# Patient Record
Sex: FEMALE | Race: WHITE | NOT HISPANIC OR LATINO | URBAN - METROPOLITAN AREA
[De-identification: names, ages, dates, MRNs, and addresses within clinical notes are randomized per-mention and may not be internally consistent; named-entity substitution may affect disease eponyms.]

---

## 2022-04-30 ENCOUNTER — TELEPHONE ENCOUNTER (OUTPATIENT)
Dept: URBAN - METROPOLITAN AREA CLINIC 121 | Facility: CLINIC | Age: 72
End: 2022-04-30

## 2022-04-30 RX ORDER — GLIPIZIDE AND METFORMIN HYDROCHLORIDE 2.5; 25 MG/1; MG/1
TABLET, FILM COATED ORAL
OUTPATIENT
Start: 2013-07-02

## 2022-05-01 ENCOUNTER — TELEPHONE ENCOUNTER (OUTPATIENT)
Dept: URBAN - METROPOLITAN AREA CLINIC 121 | Facility: CLINIC | Age: 72
End: 2022-05-01

## 2022-05-01 RX ORDER — GLIPIZIDE AND METFORMIN HYDROCHLORIDE 2.5; 25 MG/1; MG/1
2TABS PO BID TABLET, FILM COATED ORAL
Status: ACTIVE | COMMUNITY
Start: 2013-07-08

## 2022-05-01 RX ORDER — CARISOPRODOL 350 MG/1
BID PRN TABLET ORAL
Status: ACTIVE | COMMUNITY
Start: 2013-07-08

## 2022-05-01 RX ORDER — HYDROCODONE BITARTRATE AND ACETAMINOPHEN 7.5; 3 MG/1; MG/1
BID QD TABLET ORAL
Status: ACTIVE | COMMUNITY
Start: 2013-07-08

## 2022-05-01 RX ORDER — HYOSCYAMINE SULFATE 0.38 MG/1
TAKE 1 TABLET BY MOUTH BEFORE DINNER TABLET, EXTENDED RELEASE ORAL
Status: ACTIVE | COMMUNITY
Start: 2013-07-08

## 2022-06-07 ENCOUNTER — ANESTHESIA EVENT (OUTPATIENT)
Dept: SURGERY | Age: 72
End: 2022-06-07
Payer: MEDICARE

## 2022-06-07 ENCOUNTER — PREP FOR PROCEDURE (OUTPATIENT)
Dept: ADMINISTRATIVE | Age: 72
End: 2022-06-07

## 2022-06-07 RX ORDER — PHENYLEPHRINE HCL 2.5 %
1 DROPS OPHTHALMIC (EYE)
Status: CANCELLED | OUTPATIENT
Start: 2022-06-07 | End: 2022-06-07

## 2022-06-07 RX ORDER — CYCLOPENTOLATE HYDROCHLORIDE 20 MG/ML
1 SOLUTION/ DROPS OPHTHALMIC
Status: CANCELLED | OUTPATIENT
Start: 2022-06-07 | End: 2022-06-07

## 2022-06-07 RX ORDER — TROPICAMIDE 10 MG/ML
1 SOLUTION/ DROPS OPHTHALMIC
Status: CANCELLED | OUTPATIENT
Start: 2022-06-07 | End: 2022-06-07

## 2022-06-08 ENCOUNTER — ANESTHESIA (OUTPATIENT)
Dept: SURGERY | Age: 72
End: 2022-06-08
Payer: MEDICARE

## 2022-06-08 ENCOUNTER — HOSPITAL ENCOUNTER (OUTPATIENT)
Age: 72
Setting detail: OUTPATIENT SURGERY
Discharge: HOME | End: 2022-06-08
Attending: OPHTHALMOLOGY | Admitting: OPHTHALMOLOGY
Payer: MEDICARE

## 2022-06-08 VITALS
DIASTOLIC BLOOD PRESSURE: 84 MMHG | TEMPERATURE: 98.2 F | SYSTOLIC BLOOD PRESSURE: 178 MMHG | RESPIRATION RATE: 16 BRPM | OXYGEN SATURATION: 100 % | HEART RATE: 94 BPM | WEIGHT: 180 LBS

## 2022-06-08 LAB
GLUCOSE BLD STRIP.AUTO-MCNC: 112 MG/DL (ref 65–100)
SERVICE CMNT-IMP: ABNORMAL

## 2022-06-08 PROCEDURE — 7100000001 HC PACU RECOVERY - ADDTL 15 MIN: Performed by: OPHTHALMOLOGY

## 2022-06-08 PROCEDURE — 6370000000 HC RX 637 (ALT 250 FOR IP): Performed by: OPHTHALMOLOGY

## 2022-06-08 PROCEDURE — 2720000010 HC SURG SUPPLY STERILE: Performed by: OPHTHALMOLOGY

## 2022-06-08 PROCEDURE — 2500000003 HC RX 250 WO HCPCS: Performed by: NURSE ANESTHETIST, CERTIFIED REGISTERED

## 2022-06-08 PROCEDURE — 2500000003 HC RX 250 WO HCPCS: Performed by: OPHTHALMOLOGY

## 2022-06-08 PROCEDURE — 7100000010 HC PHASE II RECOVERY - FIRST 15 MIN: Performed by: OPHTHALMOLOGY

## 2022-06-08 PROCEDURE — 7100000011 HC PHASE II RECOVERY - ADDTL 15 MIN: Performed by: OPHTHALMOLOGY

## 2022-06-08 PROCEDURE — 82962 GLUCOSE BLOOD TEST: CPT

## 2022-06-08 PROCEDURE — 6360000002 HC RX W HCPCS: Performed by: NURSE ANESTHETIST, CERTIFIED REGISTERED

## 2022-06-08 PROCEDURE — 7100000000 HC PACU RECOVERY - FIRST 15 MIN: Performed by: OPHTHALMOLOGY

## 2022-06-08 PROCEDURE — 6360000002 HC RX W HCPCS: Performed by: OPHTHALMOLOGY

## 2022-06-08 PROCEDURE — 3600000014 HC SURGERY LEVEL 4 ADDTL 15MIN: Performed by: OPHTHALMOLOGY

## 2022-06-08 PROCEDURE — 2580000003 HC RX 258: Performed by: ANESTHESIOLOGY

## 2022-06-08 PROCEDURE — 3600000004 HC SURGERY LEVEL 4 BASE: Performed by: OPHTHALMOLOGY

## 2022-06-08 PROCEDURE — 3700000001 HC ADD 15 MINUTES (ANESTHESIA): Performed by: OPHTHALMOLOGY

## 2022-06-08 PROCEDURE — 2580000003 HC RX 258: Performed by: OPHTHALMOLOGY

## 2022-06-08 PROCEDURE — 3700000000 HC ANESTHESIA ATTENDED CARE: Performed by: OPHTHALMOLOGY

## 2022-06-08 PROCEDURE — 2709999900 HC NON-CHARGEABLE SUPPLY: Performed by: OPHTHALMOLOGY

## 2022-06-08 DEVICE — IMPLANTABLE DEVICE
Type: IMPLANTABLE DEVICE | Site: EYE | Status: FUNCTIONAL
Brand: SILICONE STRIP

## 2022-06-08 DEVICE — SLEEVE SCLER BCKL L30IN OD21IN ID1IN SIL STYL 70: Type: IMPLANTABLE DEVICE | Site: EYE | Status: FUNCTIONAL

## 2022-06-08 RX ORDER — CYCLOPENTOLATE HYDROCHLORIDE 20 MG/ML
1 SOLUTION/ DROPS OPHTHALMIC
Status: COMPLETED | OUTPATIENT
Start: 2022-06-08 | End: 2022-06-08

## 2022-06-08 RX ORDER — ALPRAZOLAM 1 MG/1
1 TABLET ORAL NIGHTLY PRN
COMMUNITY

## 2022-06-08 RX ORDER — BRIMONIDINE TARTRATE 2 MG/ML
SOLUTION/ DROPS OPHTHALMIC PRN
Status: DISCONTINUED | OUTPATIENT
Start: 2022-06-08 | End: 2022-06-08 | Stop reason: ALTCHOICE

## 2022-06-08 RX ORDER — SODIUM CHLORIDE, SODIUM LACTATE, POTASSIUM CHLORIDE, CALCIUM CHLORIDE 600; 310; 30; 20 MG/100ML; MG/100ML; MG/100ML; MG/100ML
INJECTION, SOLUTION INTRAVENOUS CONTINUOUS
Status: DISCONTINUED | OUTPATIENT
Start: 2022-06-08 | End: 2022-06-08 | Stop reason: HOSPADM

## 2022-06-08 RX ORDER — PROPOFOL 10 MG/ML
INJECTION, EMULSION INTRAVENOUS PRN
Status: DISCONTINUED | OUTPATIENT
Start: 2022-06-08 | End: 2022-06-08 | Stop reason: SDUPTHER

## 2022-06-08 RX ORDER — DIPHENHYDRAMINE HYDROCHLORIDE 50 MG/ML
12.5 INJECTION INTRAMUSCULAR; INTRAVENOUS
Status: DISCONTINUED | OUTPATIENT
Start: 2022-06-08 | End: 2022-06-08 | Stop reason: HOSPADM

## 2022-06-08 RX ORDER — SODIUM CHLORIDE 9 MG/ML
INJECTION, SOLUTION INTRAVENOUS PRN
Status: DISCONTINUED | OUTPATIENT
Start: 2022-06-08 | End: 2022-06-08 | Stop reason: HOSPADM

## 2022-06-08 RX ORDER — BUPIVACAINE HYDROCHLORIDE 7.5 MG/ML
INJECTION, SOLUTION EPIDURAL; RETROBULBAR PRN
Status: DISCONTINUED | OUTPATIENT
Start: 2022-06-08 | End: 2022-06-08 | Stop reason: ALTCHOICE

## 2022-06-08 RX ORDER — LIDOCAINE HYDROCHLORIDE 20 MG/ML
INJECTION, SOLUTION INFILTRATION; PERINEURAL PRN
Status: DISCONTINUED | OUTPATIENT
Start: 2022-06-08 | End: 2022-06-08 | Stop reason: ALTCHOICE

## 2022-06-08 RX ORDER — SODIUM CHLORIDE 0.9 % (FLUSH) 0.9 %
5-40 SYRINGE (ML) INJECTION EVERY 12 HOURS SCHEDULED
Status: DISCONTINUED | OUTPATIENT
Start: 2022-06-08 | End: 2022-06-08 | Stop reason: HOSPADM

## 2022-06-08 RX ORDER — PROCHLORPERAZINE EDISYLATE 5 MG/ML
5 INJECTION INTRAMUSCULAR; INTRAVENOUS
Status: DISCONTINUED | OUTPATIENT
Start: 2022-06-08 | End: 2022-06-08 | Stop reason: HOSPADM

## 2022-06-08 RX ORDER — SODIUM CHLORIDE, POTASSIUM CHLORIDE, CALCIUM CHLORIDE, MAGNESIUM CHLORIDE, SODIUM ACETATE, AND SODIUM CITRATE 6.4; .75; .48; .3; 3.9; 1.7 MG/ML; MG/ML; MG/ML; MG/ML; MG/ML; MG/ML
SOLUTION IRRIGATION PRN
Status: DISCONTINUED | OUTPATIENT
Start: 2022-06-08 | End: 2022-06-08 | Stop reason: ALTCHOICE

## 2022-06-08 RX ORDER — ONDANSETRON 2 MG/ML
4 INJECTION INTRAMUSCULAR; INTRAVENOUS
Status: DISCONTINUED | OUTPATIENT
Start: 2022-06-08 | End: 2022-06-08 | Stop reason: HOSPADM

## 2022-06-08 RX ORDER — HYDROMORPHONE HYDROCHLORIDE 2 MG/ML
0.5 INJECTION, SOLUTION INTRAMUSCULAR; INTRAVENOUS; SUBCUTANEOUS EVERY 5 MIN PRN
Status: DISCONTINUED | OUTPATIENT
Start: 2022-06-08 | End: 2022-06-08 | Stop reason: HOSPADM

## 2022-06-08 RX ORDER — PIOGLITAZONEHYDROCHLORIDE 15 MG/1
15 TABLET ORAL DAILY
COMMUNITY

## 2022-06-08 RX ORDER — OXYCODONE HYDROCHLORIDE 5 MG/1
10 TABLET ORAL PRN
Status: DISCONTINUED | OUTPATIENT
Start: 2022-06-08 | End: 2022-06-08 | Stop reason: HOSPADM

## 2022-06-08 RX ORDER — ONDANSETRON 2 MG/ML
INJECTION INTRAMUSCULAR; INTRAVENOUS PRN
Status: DISCONTINUED | OUTPATIENT
Start: 2022-06-08 | End: 2022-06-08 | Stop reason: SDUPTHER

## 2022-06-08 RX ORDER — PHENYLEPHRINE HYDROCHLORIDE 10 MG/ML
INJECTION INTRAVENOUS PRN
Status: DISCONTINUED | OUTPATIENT
Start: 2022-06-08 | End: 2022-06-08 | Stop reason: SDUPTHER

## 2022-06-08 RX ORDER — OXYCODONE HYDROCHLORIDE 5 MG/1
5 TABLET ORAL PRN
Status: DISCONTINUED | OUTPATIENT
Start: 2022-06-08 | End: 2022-06-08 | Stop reason: HOSPADM

## 2022-06-08 RX ORDER — SODIUM CHLORIDE 0.9 % (FLUSH) 0.9 %
5-40 SYRINGE (ML) INJECTION PRN
Status: DISCONTINUED | OUTPATIENT
Start: 2022-06-08 | End: 2022-06-08 | Stop reason: HOSPADM

## 2022-06-08 RX ORDER — LIDOCAINE HYDROCHLORIDE 20 MG/ML
INJECTION, SOLUTION EPIDURAL; INFILTRATION; INTRACAUDAL; PERINEURAL PRN
Status: DISCONTINUED | OUTPATIENT
Start: 2022-06-08 | End: 2022-06-08 | Stop reason: SDUPTHER

## 2022-06-08 RX ORDER — DEXAMETHASONE SODIUM PHOSPHATE 10 MG/ML
INJECTION INTRAMUSCULAR; INTRAVENOUS PRN
Status: DISCONTINUED | OUTPATIENT
Start: 2022-06-08 | End: 2022-06-08 | Stop reason: SDUPTHER

## 2022-06-08 RX ORDER — BETAMETHASONE SODIUM PHOSPHATE AND BETAMETHASONE ACETATE 3; 3 MG/ML; MG/ML
INJECTION, SUSPENSION INTRA-ARTICULAR; INTRALESIONAL; INTRAMUSCULAR; SOFT TISSUE PRN
Status: DISCONTINUED | OUTPATIENT
Start: 2022-06-08 | End: 2022-06-08 | Stop reason: ALTCHOICE

## 2022-06-08 RX ORDER — TRIAMCINOLONE ACETONIDE 40 MG/ML
INJECTION, SUSPENSION INTRA-ARTICULAR; INTRAMUSCULAR PRN
Status: DISCONTINUED | OUTPATIENT
Start: 2022-06-08 | End: 2022-06-08 | Stop reason: ALTCHOICE

## 2022-06-08 RX ORDER — PHENYLEPHRINE HCL 2.5 %
1 DROPS OPHTHALMIC (EYE)
Status: COMPLETED | OUTPATIENT
Start: 2022-06-08 | End: 2022-06-08

## 2022-06-08 RX ORDER — CEFAZOLIN SODIUM 1 G/3ML
INJECTION, POWDER, FOR SOLUTION INTRAMUSCULAR; INTRAVENOUS PRN
Status: DISCONTINUED | OUTPATIENT
Start: 2022-06-08 | End: 2022-06-08 | Stop reason: ALTCHOICE

## 2022-06-08 RX ORDER — LOSARTAN POTASSIUM 25 MG/1
25 TABLET ORAL DAILY
COMMUNITY

## 2022-06-08 RX ORDER — MIDAZOLAM HYDROCHLORIDE 2 MG/2ML
2 INJECTION, SOLUTION INTRAMUSCULAR; INTRAVENOUS
Status: DISCONTINUED | OUTPATIENT
Start: 2022-06-08 | End: 2022-06-08 | Stop reason: HOSPADM

## 2022-06-08 RX ORDER — TROPICAMIDE 10 MG/ML
1 SOLUTION/ DROPS OPHTHALMIC
Status: COMPLETED | OUTPATIENT
Start: 2022-06-08 | End: 2022-06-08

## 2022-06-08 RX ORDER — NEOMYCIN SULFATE, POLYMYXIN B SULFATE, AND DEXAMETHASONE 3.5; 10000; 1 MG/G; [USP'U]/G; MG/G
OINTMENT OPHTHALMIC PRN
Status: DISCONTINUED | OUTPATIENT
Start: 2022-06-08 | End: 2022-06-08 | Stop reason: ALTCHOICE

## 2022-06-08 RX ORDER — SODIUM CHLORIDE, SODIUM LACTATE, POTASSIUM CHLORIDE, CALCIUM CHLORIDE 600; 310; 30; 20 MG/100ML; MG/100ML; MG/100ML; MG/100ML
125 INJECTION, SOLUTION INTRAVENOUS CONTINUOUS
Status: DISCONTINUED | OUTPATIENT
Start: 2022-06-08 | End: 2022-06-08 | Stop reason: HOSPADM

## 2022-06-08 RX ORDER — HYDROMORPHONE HYDROCHLORIDE 2 MG/ML
0.25 INJECTION, SOLUTION INTRAMUSCULAR; INTRAVENOUS; SUBCUTANEOUS EVERY 5 MIN PRN
Status: DISCONTINUED | OUTPATIENT
Start: 2022-06-08 | End: 2022-06-08 | Stop reason: HOSPADM

## 2022-06-08 RX ORDER — LIDOCAINE HYDROCHLORIDE 10 MG/ML
1 INJECTION, SOLUTION EPIDURAL; INFILTRATION; INTRACAUDAL; PERINEURAL
Status: DISCONTINUED | OUTPATIENT
Start: 2022-06-08 | End: 2022-06-08 | Stop reason: HOSPADM

## 2022-06-08 RX ORDER — OXYCODONE AND ACETAMINOPHEN 10; 325 MG/1; MG/1
1 TABLET ORAL EVERY 6 HOURS PRN
COMMUNITY

## 2022-06-08 RX ADMIN — PHENYLEPHRINE HYDROCHLORIDE 100 MCG: 10 INJECTION INTRAVENOUS at 11:16

## 2022-06-08 RX ADMIN — PHENYLEPHRINE HYDROCHLORIDE 100 MCG: 10 INJECTION INTRAVENOUS at 11:27

## 2022-06-08 RX ADMIN — PHENYLEPHRINE HYDROCHLORIDE 1 DROP: 25 SOLUTION/ DROPS OPHTHALMIC at 08:05

## 2022-06-08 RX ADMIN — PHENYLEPHRINE HYDROCHLORIDE 100 MCG: 10 INJECTION INTRAVENOUS at 11:15

## 2022-06-08 RX ADMIN — TROPICAMIDE 1 DROP: 10 SOLUTION/ DROPS OPHTHALMIC at 08:15

## 2022-06-08 RX ADMIN — ACETAZOLAMIDE 500 MG: 500 INJECTION, POWDER, LYOPHILIZED, FOR SOLUTION INTRAVENOUS at 12:20

## 2022-06-08 RX ADMIN — TROPICAMIDE 1 DROP: 10 SOLUTION/ DROPS OPHTHALMIC at 08:05

## 2022-06-08 RX ADMIN — ONDANSETRON 4 MG: 2 INJECTION INTRAMUSCULAR; INTRAVENOUS at 10:47

## 2022-06-08 RX ADMIN — PROPOFOL 200 MG: 10 INJECTION, EMULSION INTRAVENOUS at 10:08

## 2022-06-08 RX ADMIN — CYCLOPENTOLATE HYDROCHLORIDE 1 DROP: 20 SOLUTION/ DROPS OPHTHALMIC at 08:15

## 2022-06-08 RX ADMIN — SODIUM CHLORIDE, POTASSIUM CHLORIDE, SODIUM LACTATE AND CALCIUM CHLORIDE: 600; 310; 30; 20 INJECTION, SOLUTION INTRAVENOUS at 08:21

## 2022-06-08 RX ADMIN — CYCLOPENTOLATE HYDROCHLORIDE 1 DROP: 20 SOLUTION/ DROPS OPHTHALMIC at 07:58

## 2022-06-08 RX ADMIN — DEXAMETHASONE SODIUM PHOSPHATE 10 MG: 10 INJECTION INTRAMUSCULAR; INTRAVENOUS at 10:45

## 2022-06-08 RX ADMIN — PHENYLEPHRINE HYDROCHLORIDE 100 MCG: 10 INJECTION INTRAVENOUS at 11:05

## 2022-06-08 RX ADMIN — PHENYLEPHRINE HYDROCHLORIDE 1 DROP: 25 SOLUTION/ DROPS OPHTHALMIC at 08:15

## 2022-06-08 RX ADMIN — PHENYLEPHRINE HYDROCHLORIDE 1 DROP: 25 SOLUTION/ DROPS OPHTHALMIC at 07:57

## 2022-06-08 RX ADMIN — TROPICAMIDE 1 DROP: 10 SOLUTION/ DROPS OPHTHALMIC at 07:58

## 2022-06-08 RX ADMIN — CYCLOPENTOLATE HYDROCHLORIDE 1 DROP: 20 SOLUTION/ DROPS OPHTHALMIC at 08:05

## 2022-06-08 RX ADMIN — LIDOCAINE HYDROCHLORIDE 100 MG: 20 INJECTION, SOLUTION EPIDURAL; INFILTRATION; INTRACAUDAL; PERINEURAL at 10:08

## 2022-06-08 ASSESSMENT — PAIN SCALES - GENERAL
PAINLEVEL_OUTOF10: 0
PAINLEVEL_OUTOF10: 0

## 2022-06-08 ASSESSMENT — PAIN - FUNCTIONAL ASSESSMENT: PAIN_FUNCTIONAL_ASSESSMENT: 0-10

## 2022-06-08 NOTE — ANESTHESIA POSTPROCEDURE EVALUATION
Department of Anesthesiology  Postprocedure Note    Patient: Paula José  MRN: [id number]  YOB: 1950  Date of evaluation: 6/8/2022  Time:  1342    Procedure Summary     Date: 06/08/22 Room / Location: [facility] OP OR 08 / [facility]    Anesthesia Start: 1002 Anesthesia Stop: 1214    Procedure: LEFT VITRECTOMY 25 GAUGE/ LASER/ SF6 GAS FLUID EXCHANGE/ SCLERAL BUCKLE PLACEMENT (Left Eye) Diagnosis:       Total retinal detachment, left eye      Combined forms of age-related cataract of left eye      (Total retinal detachment, left eye [H33.052])      (Combined forms of age-related cataract of left eye [H25.812])    Surgeons: Alexi Kessler MD Responsible Provider: Ar Yo IV, MD    Anesthesia Type: General ASA Status: 3          Anesthesia Type: General    Milena Phase I: Milena Score: 8    Milena Phase II: Milena Score: 10    Last vitals: Reviewed and per EMR flowsheets. Anesthesia Post Evaluation    Patient location during evaluation: PACU  Patient participation: complete - patient participated  Level of consciousness: awake and alert  Airway patency: patent  Nausea & Vomiting: no nausea  Complications: no  Cardiovascular status: blood pressure returned to baseline and hemodynamically stable (was in Afib with rate control prior to OR and remains in Afib in the PACU.   She cannot be anticoagulated for a few days postop anyway, so she will followup with her PCP early next week and will remain on an ASA until then.)  Respiratory status: acceptable  Hydration status: euvolemic

## 2022-06-08 NOTE — ANESTHESIA PROCEDURE NOTES
Airway  Urgency: elective    Airway not difficult    General Information and Staff    Patient location during procedure: OR  Resident/CRNA: Yolis Tyson, APRN - CRNA    Indications and Patient Condition  Indications for airway management: anesthesia  Spontaneous Ventilation: absent  Sedation level: deep  Preoxygenated: yes  Mask difficulty assessment: vent by bag mask    Final Airway Details  Final airway type: supraglottic airway (LMA)      Successful airway: Size 4  Cuff Pressure (cm H2O): 20    Number of attempts at approach: 1  Number of other approaches attempted: 1      Non-anticipated difficult airway: yes

## 2022-06-08 NOTE — OP NOTE
insertions in a mattress fashion in all four quadrants. The knots were rotated posteriorly. The buckle was tightened to its desired tension, and the buckle was trimmed. The microscope was brought into position. Standard 25 gauge instrumentation was used to create sclerotomies 4.0 mm posterior to the limbus. The infusion cannula was verified prior to turning on the infusion. A dense cataract was present. Upon visualization with the light pipe, the retinal detachment extended from 11 to 8 o'clock with the macula detached and a retinal tear present at 1 o'clock. The retina was inspected 360-degrees and no additional retinal tears or breaks were noted. A core vitrectomy was performed. A posterior vitreous detachment was already present. Intravitreal triesence was instilled to better visualize the vitreous. Vitrectomy was carried out 360 degrees with the aid of scleral depression. Diathermy was used to dalila the breaks and to create a drainage retinotomy superotemporally, avoiding the optic nerve, macula, and major vasculature. An air fluid exchange was then performed draining fluid through the retinotomy. Endolaser was applied to the breaks after fluid air exchange. A gas timeout was performed, confirming the appropriate type and concentration of gas. The eye was then flushed with 40 cc of 20% SF6 gas to a moderate tactile tension. The cannulas were pulled and the sclerotomies were closed with 6-0 plain gut suture. The ends of the buckle were trimmed. The 2-0 silk ties were removed. The Tenon's capsule and conjunctiva were reapproximated with 6-0 plain gut suture incorporating episcleral bites at the 3 and 9 positions. The pressure of the eye was moderate and physiologic to tactile tension. A subconjunctival injection of ancef and dexamethasone was given. At the end of case the IOP < 15 mmHg, the nerve was perfused, and the retina was attached 360.  The eye was dressed with brimonidine, cyclogyl, and maxitrol ointment. A patch was placed over the eye. The patient tolerated the procedure well. There were no complications. The patient received post operative instructions including follow up with [facility].           Estimated Blood Loss:     Less than 1 cc         Specimens:     None       Complications:     None             Signed by: Jeni Verduzco MD     06/08/22 12:06 PM

## 2022-06-08 NOTE — ANESTHESIA PRE PROCEDURE
Department of Anesthesiology  Preprocedure Note       Name:  Marisol Bourne   Age:  70 y.o.  :  1950                                          MRN:  [id number]         Date:  2022      Surgeon: Gricelda Calvo):  Gerard Beck MD    Procedure: Procedure(s):  LEFT VITRECTOMY 25 GAUGE/ LASER/ GAS FLUID EXCHANGE/ SCLERAL BUCKLE PLACEMENT/POSS LENSECTOMY    Medications prior to admission:   Prior to Admission medications    Medication Sig Start Date End Date Taking? Authorizing Provider   pioglitazone (ACTOS) 15 MG tablet Take 15 mg by mouth daily   Yes Historical Provider, MD   ALPRAZolam (XANAX) 1 MG tablet Take 1 mg by mouth nightly as needed for Sleep. Yes Historical Provider, MD   oxyCODONE-acetaminophen (PERCOCET)  MG per tablet Take 1 tablet by mouth every 6 hours as needed for Pain. Yes Historical Provider, MD   losartan (COZAAR) 25 MG tablet Take 25 mg by mouth daily   Yes Historical Provider, MD       Current medications:    Current Facility-Administered Medications   Medication Dose Route Frequency Provider Last Rate Last Admin    lidocaine PF 1 % injection 1 mL  1 mL IntraDERmal Once PRN Zay Castellanos IV, MD        lactated ringers infusion   IntraVENous Continuous Zay Castellanos IV,  mL/hr at 22 0821 New Bag at 22 3011    sodium chloride flush 0.9 % injection 5-40 mL  5-40 mL IntraVENous 2 times per day Zay Castellanos IV, MD        sodium chloride flush 0.9 % injection 5-40 mL  5-40 mL IntraVENous PRN Zay Castellanos IV, MD        0.9 % sodium chloride infusion   IntraVENous PRN Zay Castellanos IV, MD        midazolam PF (VERSED) injection 2 mg  2 mg IntraVENous Once PRN Zay Castelalnos IV, MD           Allergies: Allergies   Allergen Reactions    Dilaudid [Hydromorphone] Anaphylaxis    Morphine Anaphylaxis    Keflex [Cephalexin] Nausea Only    Tylenol [Acetaminophen] Swelling       Problem List:  There is no problem list on file for this patient.       Past Medical History:        Diagnosis Date    Diabetes mellitus ([facility] [address]     Hypertension        Past Surgical History:  History reviewed. No pertinent surgical history.     Social History:    Social History     Tobacco Use    Smoking status: Never Smoker    Smokeless tobacco: Never Used   Substance Use Topics    Alcohol use: Not on file                                Counseling given: Not Answered      Vital Signs (Current):   Vitals:    06/08/22 0737   BP: (!) 176/77   Pulse: 95   Resp: 18   Temp: 97.6 °F (36.4 °C)   TempSrc: Oral   SpO2: 98%   Weight: 180 lb (81.6 kg)                                              BP Readings from Last 3 Encounters:   06/08/22 (!) 176/77       NPO Status: Time of last liquid consumption: 0630                        Time of last solid consumption: 2200                        Date of last liquid consumption: 06/08/22                        Date of last solid food consumption: 06/07/22    BMI:   Wt Readings from Last 3 Encounters:   06/08/22 180 lb (81.6 kg)     There is no height or weight on file to calculate BMI.    CBC: No results found for: WBC, RBC, HGB, HCT, MCV, RDW, PLT    CMP: No results found for: NA, K, CL, CO2, BUN, CREATININE, GFRAA, AGRATIO, LABGLOM, GLUCOSE, GLU, PROT, CALCIUM, BILITOT, ALKPHOS, AST, ALT    POC Tests:   Recent Labs     06/08/22  0754   POCGLU 112*       Coags: No results found for: PROTIME, INR, APTT    HCG (If Applicable): No results found for: PREGTESTUR, PREGSERUM, HCG, HCGQUANT     ABGs: No results found for: PHART, PO2ART, WFG6WMK, HDN6SEI, BEART, X1ZFLDPF     Type & Screen (If Applicable):  No results found for: LABABO, LABRH    Drug/Infectious Status (If Applicable):  No results found for: HIV, HEPCAB    COVID-19 Screening (If Applicable): No results found for: COVID19        Anesthesia Evaluation  Patient summary reviewed and Nursing notes reviewed  Airway: Mallampati: I  TM distance: >3 FB   Neck ROM: full  Mouth opening: > = 3 FB   Dental: (+) poor dentition  Comment: Edentulous upper, 2 stand alone chipped teeth lower    Pulmonary: breath sounds clear to auscultation  (+) asthma:                           ROS comment: Reports right lung problems surrounding colds, denies recent URI   Cardiovascular:  Exercise tolerance: poor (<4 METS),   (+) hypertension:,         Rhythm: irregular  Rate: normal                    Neuro/Psych:               GI/Hepatic/Renal:        (-) GERD       Endo/Other:    (+) DiabetesType II DM, well controlled, , .                 Abdominal:             Vascular: Other Findings:           Anesthesia Plan      general     ASA 3       Induction: intravenous. Anesthetic plan and risks discussed with patient.                         Beth Hare MD   6/8/2022

## 2023-05-22 ENCOUNTER — OFFICE VISIT (OUTPATIENT)
Dept: ORTHOPEDIC SURGERY | Age: 73
End: 2023-05-22

## 2023-05-22 VITALS — BODY MASS INDEX: 28.89 KG/M2 | WEIGHT: 195.04 LBS | HEIGHT: 69 IN

## 2023-05-22 DIAGNOSIS — M17.12 PRIMARY OSTEOARTHRITIS OF LEFT KNEE: ICD-10-CM

## 2023-05-22 DIAGNOSIS — G89.29 CHRONIC BILATERAL LOW BACK PAIN, UNSPECIFIED WHETHER SCIATICA PRESENT: ICD-10-CM

## 2023-05-22 DIAGNOSIS — M25.562 ACUTE PAIN OF LEFT KNEE: Primary | ICD-10-CM

## 2023-05-22 DIAGNOSIS — M54.50 CHRONIC BILATERAL LOW BACK PAIN, UNSPECIFIED WHETHER SCIATICA PRESENT: ICD-10-CM

## 2023-05-22 DIAGNOSIS — M17.11 PRIMARY OSTEOARTHRITIS OF RIGHT KNEE: ICD-10-CM

## 2023-05-22 RX ORDER — METHYLPREDNISOLONE ACETATE 40 MG/ML
40 INJECTION, SUSPENSION INTRA-ARTICULAR; INTRALESIONAL; INTRAMUSCULAR; SOFT TISSUE ONCE
Status: COMPLETED | OUTPATIENT
Start: 2023-05-22 | End: 2023-05-22

## 2023-05-22 RX ADMIN — METHYLPREDNISOLONE ACETATE 40 MG: 40 INJECTION, SUSPENSION INTRA-ARTICULAR; INTRALESIONAL; INTRAMUSCULAR; SOFT TISSUE at 13:38
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
## 2024-08-14 ENCOUNTER — HOSPITAL ENCOUNTER (EMERGENCY)
Age: 74
Discharge: HOME | End: 2024-08-14
Attending: STUDENT IN AN ORGANIZED HEALTH CARE EDUCATION/TRAINING PROGRAM
Payer: MEDICARE

## 2024-08-14 ENCOUNTER — APPOINTMENT (OUTPATIENT)
Dept: CT IMAGING | Age: 74
End: 2024-08-14
Payer: MEDICARE

## 2024-08-14 VITALS
RESPIRATION RATE: 18 BRPM | OXYGEN SATURATION: 100 % | HEIGHT: 68 IN | TEMPERATURE: 97.9 F | WEIGHT: 180 LBS | SYSTOLIC BLOOD PRESSURE: 177 MMHG | HEART RATE: 81 BPM | BODY MASS INDEX: 27.28 KG/M2 | DIASTOLIC BLOOD PRESSURE: 87 MMHG

## 2024-08-14 DIAGNOSIS — S00.83XA FACIAL HEMATOMA, INITIAL ENCOUNTER: ICD-10-CM

## 2024-08-14 DIAGNOSIS — S09.90XA CLOSED HEAD INJURY, INITIAL ENCOUNTER: Primary | ICD-10-CM

## 2024-08-14 LAB
ALBUMIN SERPL-MCNC: 4 G/DL (ref 3.2–4.6)
ALBUMIN/GLOB SERPL: 1.3 (ref 1–1.9)
ALP SERPL-CCNC: 94 U/L (ref 35–104)
ALT SERPL-CCNC: 15 U/L (ref 12–65)
ANION GAP SERPL CALC-SCNC: 10 MMOL/L (ref 9–18)
AST SERPL-CCNC: 22 U/L (ref 15–37)
BASOPHILS # BLD: 0 K/UL (ref 0–0.2)
BASOPHILS NFR BLD: 1 % (ref 0–2)
BILIRUB SERPL-MCNC: 0.7 MG/DL (ref 0–1.2)
BUN SERPL-MCNC: 13 MG/DL (ref 8–23)
CALCIUM SERPL-MCNC: 9.3 MG/DL (ref 8.8–10.2)
CHLORIDE SERPL-SCNC: 106 MMOL/L (ref 98–107)
CO2 SERPL-SCNC: 25 MMOL/L (ref 20–28)
CREAT SERPL-MCNC: 0.87 MG/DL (ref 0.6–1.1)
DIFFERENTIAL METHOD BLD: NORMAL
EOSINOPHIL # BLD: 0.1 K/UL (ref 0–0.8)
EOSINOPHIL NFR BLD: 3 % (ref 0.5–7.8)
ERYTHROCYTE [DISTWIDTH] IN BLOOD BY AUTOMATED COUNT: 13.9 % (ref 11.9–14.6)
GLOBULIN SER CALC-MCNC: 3 G/DL (ref 2.3–3.5)
GLUCOSE SERPL-MCNC: 124 MG/DL (ref 70–99)
HCT VFR BLD AUTO: 39.7 % (ref 35.8–46.3)
HGB BLD-MCNC: 12.6 G/DL (ref 11.7–15.4)
IMM GRANULOCYTES # BLD AUTO: 0 K/UL (ref 0–0.5)
IMM GRANULOCYTES NFR BLD AUTO: 0 % (ref 0–5)
LYMPHOCYTES # BLD: 1.7 K/UL (ref 0.5–4.6)
LYMPHOCYTES NFR BLD: 32 % (ref 13–44)
MCH RBC QN AUTO: 29.6 PG (ref 26.1–32.9)
MCHC RBC AUTO-ENTMCNC: 31.7 G/DL (ref 31.4–35)
MCV RBC AUTO: 93.2 FL (ref 82–102)
MONOCYTES # BLD: 0.4 K/UL (ref 0.1–1.3)
MONOCYTES NFR BLD: 7 % (ref 4–12)
NEUTS SEG # BLD: 2.9 K/UL (ref 1.7–8.2)
NEUTS SEG NFR BLD: 57 % (ref 43–78)
NRBC # BLD: 0 K/UL (ref 0–0.2)
PLATELET # BLD AUTO: 182 K/UL (ref 150–450)
PMV BLD AUTO: 9.7 FL (ref 9.4–12.3)
POTASSIUM SERPL-SCNC: 3.9 MMOL/L (ref 3.5–5.1)
PROT SERPL-MCNC: 7 G/DL (ref 6.3–8.2)
RBC # BLD AUTO: 4.26 M/UL (ref 4.05–5.2)
SODIUM SERPL-SCNC: 142 MMOL/L (ref 136–145)
WBC # BLD AUTO: 5.1 K/UL (ref 4.3–11.1)

## 2024-08-14 PROCEDURE — 70450 CT HEAD/BRAIN W/O DYE: CPT

## 2024-08-14 PROCEDURE — 80053 COMPREHEN METABOLIC PANEL: CPT

## 2024-08-14 PROCEDURE — 6370000000 HC RX 637 (ALT 250 FOR IP): Performed by: STUDENT IN AN ORGANIZED HEALTH CARE EDUCATION/TRAINING PROGRAM

## 2024-08-14 PROCEDURE — 99284 EMERGENCY DEPT VISIT MOD MDM: CPT

## 2024-08-14 PROCEDURE — 85025 COMPLETE CBC W/AUTO DIFF WBC: CPT

## 2024-08-14 RX ORDER — ACETAMINOPHEN 325 MG/1
650 TABLET ORAL
Status: DISCONTINUED | OUTPATIENT
Start: 2024-08-14 | End: 2024-08-14

## 2024-08-14 RX ORDER — HYDROCODONE BITARTRATE AND ACETAMINOPHEN 5; 325 MG/1; MG/1
1 TABLET ORAL EVERY 6 HOURS PRN
Qty: 8 TABLET | Refills: 0 | Status: SHIPPED | OUTPATIENT
Start: 2024-08-14 | End: 2024-08-17

## 2024-08-14 RX ORDER — HYDROCODONE BITARTRATE AND ACETAMINOPHEN 5; 325 MG/1; MG/1
1 TABLET ORAL
Status: COMPLETED | OUTPATIENT
Start: 2024-08-14 | End: 2024-08-14

## 2024-08-14 RX ADMIN — HYDROCODONE BITARTRATE AND ACETAMINOPHEN 1 TABLET: 5; 325 TABLET ORAL at 12:17

## 2024-08-14 ASSESSMENT — PAIN DESCRIPTION - LOCATION: LOCATION: HEAD

## 2024-08-14 ASSESSMENT — LIFESTYLE VARIABLES
HOW MANY STANDARD DRINKS CONTAINING ALCOHOL DO YOU HAVE ON A TYPICAL DAY: PATIENT DOES NOT DRINK
HOW OFTEN DO YOU HAVE A DRINK CONTAINING ALCOHOL: NEVER

## 2024-08-14 ASSESSMENT — PAIN SCALES - GENERAL
PAINLEVEL_OUTOF10: 9
PAINLEVEL_OUTOF10: 9

## 2024-08-14 ASSESSMENT — PAIN - FUNCTIONAL ASSESSMENT: PAIN_FUNCTIONAL_ASSESSMENT: 0-10

## 2024-08-14 NOTE — ED NOTES
Attempted to call patients son Russell for discharge.  Verified correct number in chart for patient.      Alta Hernandez RN  08/14/24 4627

## 2024-08-14 NOTE — ED TRIAGE NOTES
Pt reports fall on 08/03/24. Pt denies any other additional falls. Pt on eliquis. Pt states she was seen on 08/03/ There is a protrusion on rt side of head. Pt reports worsening pain. Denies recent LOC. 165/95, HR 78, sat 97, bgl 128

## 2024-08-14 NOTE — CARE COORDINATION
08/14/24 1139   Service Assessment   Patient Orientation Alert and Oriented   Cognition Alert   History Provided By Patient   Primary Caregiver Self   Support Systems Family Members;Friends/Neighbors   PCP Verified by CM Yes   Prior Functional Level Independent in ADLs/IADLs   Current Functional Level Independent in ADLs/IADLs   Can patient return to prior living arrangement Yes   Ability to make needs known: Good   Family able to assist with home care needs: Yes   Would you like for me to discuss the discharge plan with any other family members/significant others, and if so, who? Yes  (talia Baer [phone])   Financial Resources Medicaid;Medicare   Community Resources Other (Comment)  (Medicaid )   Social/Functional History   Lives With Alone   Type of Home   (shed)   Home Equipment None   ADL Assistance Independent   Discharge Planning   Type of Residence Other (Comment)  (shed)   Living Arrangements Alone   Potential Assistance Needed Transportation   Potential Assistance Purchasing Medications No   Type of Home Care Services None   Services At/After Discharge   Services At/After Discharge Transport     RN CM met with the patient at the bedside and discussed discharge planning. She lives alone and stated she is independent of ADLs at baseline. She does not use DMEs or external services and has a Medicaid . She stated she has a physician. She may need transportation assistance home from the hospital. RN CM encouraged her to ask questions. She verbalized understanding and agreement with the discharge plan.

## 2024-08-14 NOTE — DISCHARGE INSTRUCTIONS
Apply ice to your area of discomfort and swelling.  Take Norco as needed, use caution this medication is a narcotic and can be sedating.  Do not drive or operate heavy machinery while taking.  Follow-up with primary care physician as well as plastic surgery.  A referral was placed for you.  Return to the ER for any worsening or worrisome symptoms

## 2024-08-14 NOTE — ED PROVIDER NOTES
g/dL    Globulin 3.0 2.3 - 3.5 g/dL    Albumin/Globulin Ratio 1.3 1.0 - 1.9           CT HEAD WO CONTRAST   Final Result   1. Chronic small vessel ischemic change.    2. Right superior frontal scalp hematoma versus rim calcified lymph node / mass.   Please correlate with location/mechanism of patient's trauma.   3. If patient continues to have symptoms or if there remains any further   clinical concern, MRI may help better delineate.         Electronically signed by Kim Sanchez                   No results for input(s): \"COVID19\" in the last 72 hours.    Voice dictation software was used during the making of this note.  This software is not perfect and grammatical and other typographical errors may be present.  This note has not been completely proofread for errors.     Jose Perez,   08/14/24 5292

## 2024-12-02 ENCOUNTER — APPOINTMENT (OUTPATIENT)
Dept: CT IMAGING | Age: 74
End: 2024-12-02
Payer: MEDICARE

## 2024-12-02 ENCOUNTER — HOSPITAL ENCOUNTER (EMERGENCY)
Age: 74
Discharge: HOME | End: 2024-12-02
Attending: EMERGENCY MEDICINE
Payer: MEDICARE

## 2024-12-02 VITALS
SYSTOLIC BLOOD PRESSURE: 138 MMHG | DIASTOLIC BLOOD PRESSURE: 70 MMHG | OXYGEN SATURATION: 100 % | RESPIRATION RATE: 20 BRPM | HEART RATE: 110 BPM | TEMPERATURE: 97.8 F

## 2024-12-02 DIAGNOSIS — S20.221D CONTUSION OF RIGHT SIDE OF BACK, SUBSEQUENT ENCOUNTER: Primary | ICD-10-CM

## 2024-12-02 LAB
ALBUMIN SERPL-MCNC: 2.6 G/DL (ref 3.2–4.6)
ALBUMIN/GLOB SERPL: 0.9 (ref 1–1.9)
ALP SERPL-CCNC: 70 U/L (ref 35–104)
ALT SERPL-CCNC: 9 U/L (ref 8–45)
ANION GAP SERPL CALC-SCNC: 12 MMOL/L (ref 7–16)
APPEARANCE UR: CLEAR
AST SERPL-CCNC: 20 U/L (ref 15–37)
BASOPHILS # BLD: 0.1 K/UL (ref 0–0.2)
BASOPHILS NFR BLD: 1 % (ref 0–2)
BILIRUB SERPL-MCNC: 0.7 MG/DL (ref 0–1.2)
BILIRUB UR QL: NEGATIVE
BUN SERPL-MCNC: 12 MG/DL (ref 8–23)
CALCIUM SERPL-MCNC: 6.6 MG/DL (ref 8.8–10.2)
CHLORIDE SERPL-SCNC: 113 MMOL/L (ref 98–107)
CO2 SERPL-SCNC: 18 MMOL/L (ref 20–29)
COLOR UR: ABNORMAL
CREAT SERPL-MCNC: 0.58 MG/DL (ref 0.6–1.1)
DIFFERENTIAL METHOD BLD: NORMAL
EOSINOPHIL # BLD: 0.1 K/UL (ref 0–0.8)
EOSINOPHIL NFR BLD: 1 % (ref 0.5–7.8)
ERYTHROCYTE [DISTWIDTH] IN BLOOD BY AUTOMATED COUNT: 13.4 % (ref 11.9–14.6)
GLOBULIN SER CALC-MCNC: 3 G/DL (ref 2.3–3.5)
GLUCOSE SERPL-MCNC: 161 MG/DL (ref 70–99)
GLUCOSE UR STRIP.AUTO-MCNC: NEGATIVE MG/DL
HCT VFR BLD AUTO: 43.2 % (ref 35.8–46.3)
HGB BLD-MCNC: 14.1 G/DL (ref 11.7–15.4)
HGB UR QL STRIP: NEGATIVE
IMM GRANULOCYTES # BLD AUTO: 0 K/UL (ref 0–0.5)
IMM GRANULOCYTES NFR BLD AUTO: 0 % (ref 0–5)
KETONES UR QL STRIP.AUTO: ABNORMAL MG/DL
LEUKOCYTE ESTERASE UR QL STRIP.AUTO: NEGATIVE
LIPASE SERPL-CCNC: 21 U/L (ref 13–60)
LYMPHOCYTES # BLD: 1.7 K/UL (ref 0.5–4.6)
LYMPHOCYTES NFR BLD: 20 % (ref 13–44)
MCH RBC QN AUTO: 29.4 PG (ref 26.1–32.9)
MCHC RBC AUTO-ENTMCNC: 32.6 G/DL (ref 31.4–35)
MCV RBC AUTO: 90 FL (ref 82–102)
MONOCYTES # BLD: 0.6 K/UL (ref 0.1–1.3)
MONOCYTES NFR BLD: 7 % (ref 4–12)
NEUTS SEG # BLD: 5.8 K/UL (ref 1.7–8.2)
NEUTS SEG NFR BLD: 71 % (ref 43–78)
NITRITE UR QL STRIP.AUTO: NEGATIVE
NRBC # BLD: 0 K/UL (ref 0–0.2)
PH UR STRIP: 7.5 (ref 5–9)
PLATELET # BLD AUTO: 232 K/UL (ref 150–450)
PMV BLD AUTO: 9.7 FL (ref 9.4–12.3)
POTASSIUM SERPL-SCNC: 3 MMOL/L (ref 3.5–5.1)
PROT SERPL-MCNC: 5.6 G/DL (ref 6.3–8.2)
PROT UR STRIP-MCNC: NEGATIVE MG/DL
RBC # BLD AUTO: 4.8 M/UL (ref 4.05–5.2)
SODIUM SERPL-SCNC: 142 MMOL/L (ref 136–145)
SP GR UR REFRACTOMETRY: >1.035 (ref 1–1.02)
UROBILINOGEN UR QL STRIP.AUTO: 1 EU/DL (ref 0.2–1)
WBC # BLD AUTO: 8.3 K/UL (ref 4.3–11.1)

## 2024-12-02 PROCEDURE — 81003 URINALYSIS AUTO W/O SCOPE: CPT

## 2024-12-02 PROCEDURE — 6360000002 HC RX W HCPCS: Performed by: EMERGENCY MEDICINE

## 2024-12-02 PROCEDURE — 83690 ASSAY OF LIPASE: CPT

## 2024-12-02 PROCEDURE — 96374 THER/PROPH/DIAG INJ IV PUSH: CPT

## 2024-12-02 PROCEDURE — 6360000004 HC RX CONTRAST MEDICATION: Performed by: EMERGENCY MEDICINE

## 2024-12-02 PROCEDURE — 74177 CT ABD & PELVIS W/CONTRAST: CPT

## 2024-12-02 PROCEDURE — 6370000000 HC RX 637 (ALT 250 FOR IP): Performed by: EMERGENCY MEDICINE

## 2024-12-02 PROCEDURE — 85025 COMPLETE CBC W/AUTO DIFF WBC: CPT

## 2024-12-02 PROCEDURE — 80053 COMPREHEN METABOLIC PANEL: CPT

## 2024-12-02 PROCEDURE — 99285 EMERGENCY DEPT VISIT HI MDM: CPT

## 2024-12-02 RX ORDER — KETOROLAC TROMETHAMINE 15 MG/ML
15 INJECTION, SOLUTION INTRAMUSCULAR; INTRAVENOUS
Status: COMPLETED | OUTPATIENT
Start: 2024-12-02 | End: 2024-12-02

## 2024-12-02 RX ORDER — DICLOFENAC SODIUM 75 MG/1
75 TABLET, DELAYED RELEASE ORAL 2 TIMES DAILY
Qty: 20 TABLET | Refills: 0 | Status: SHIPPED | OUTPATIENT
Start: 2024-12-02

## 2024-12-02 RX ORDER — IOPAMIDOL 755 MG/ML
100 INJECTION, SOLUTION INTRAVASCULAR
Status: COMPLETED | OUTPATIENT
Start: 2024-12-02 | End: 2024-12-02

## 2024-12-02 RX ORDER — TRAMADOL HYDROCHLORIDE 50 MG/1
50 TABLET ORAL EVERY 6 HOURS PRN
Qty: 12 TABLET | Refills: 0 | Status: SHIPPED | OUTPATIENT
Start: 2024-12-02 | End: 2024-12-05

## 2024-12-02 RX ORDER — HYDROCODONE BITARTRATE AND ACETAMINOPHEN 5; 325 MG/1; MG/1
1 TABLET ORAL
Status: COMPLETED | OUTPATIENT
Start: 2024-12-02 | End: 2024-12-02

## 2024-12-02 RX ADMIN — IOPAMIDOL 100 ML: 755 INJECTION, SOLUTION INTRAVENOUS at 17:02

## 2024-12-02 RX ADMIN — KETOROLAC TROMETHAMINE 15 MG: 15 INJECTION, SOLUTION INTRAMUSCULAR; INTRAVENOUS at 18:53

## 2024-12-02 RX ADMIN — HYDROCODONE BITARTRATE AND ACETAMINOPHEN 1 TABLET: 5; 325 TABLET ORAL at 18:53

## 2024-12-02 ASSESSMENT — PAIN DESCRIPTION - ORIENTATION
ORIENTATION: RIGHT;LOWER
ORIENTATION: LOWER

## 2024-12-02 ASSESSMENT — PAIN DESCRIPTION - LOCATION
LOCATION: BACK
LOCATION: ABDOMEN

## 2024-12-02 ASSESSMENT — ENCOUNTER SYMPTOMS: ABDOMINAL PAIN: 1

## 2024-12-02 ASSESSMENT — PAIN SCALES - GENERAL
PAINLEVEL_OUTOF10: 10

## 2024-12-02 ASSESSMENT — PAIN - FUNCTIONAL ASSESSMENT
PAIN_FUNCTIONAL_ASSESSMENT: 0-10
PAIN_FUNCTIONAL_ASSESSMENT: 0-10

## 2024-12-02 NOTE — ED TRIAGE NOTES
Pt arrives via EMS from home.  Pt reports intermittent lower right sided abdominal pain for 3 days and chronic back pain for 8 years.  Pt denies N/V/D.

## 2024-12-02 NOTE — ED PROVIDER NOTES
Medications given during this emergency department visit:  Medications   iopamidol (ISOVUE-370) 76 % injection 100 mL (100 mLs IntraVENous Given 12/2/24 1702)   ketorolac (TORADOL) injection 15 mg (15 mg IntraVENous Given 12/2/24 1853)   HYDROcodone-acetaminophen (NORCO) 5-325 MG per tablet 1 tablet (1 tablet Oral Given 12/2/24 1853)       New Prescriptions    DICLOFENAC (VOLTAREN) 75 MG EC TABLET    Take 1 tablet by mouth 2 times daily    TRAMADOL (ULTRAM) 50 MG TABLET    Take 1 tablet by mouth every 6 hours as needed for Pain for up to 3 days. Intended supply: 3 days. Take lowest dose possible to manage pain Max Daily Amount: 200 mg        Past Medical History:   Diagnosis Date    Diabetes mellitus (HCC)     Hypertension         Past Surgical History:   Procedure Laterality Date    EYE SURGERY Left 6/8/2022    LEFT VITRECTOMY 25 GAUGE/ LASER/ SF6 GAS FLUID EXCHANGE/ SCLERAL BUCKLE PLACEMENT performed by Brayan Villanueva MD at [facility]        Social History     Socioeconomic History    Marital status:    Tobacco Use    Smoking status: Never    Smokeless tobacco: Never     Social Determinants of Health     Financial Resource Strain: High Risk (11/29/2024)    Received from [facility]    Financial Resource Strain     Difficulty Paying Living Expenses: Very hard     Difficulty Paying Medical Expenses: Not asked   Food Insecurity: Food Insecurity Present (11/29/2024)    Received from [facility]    Food Insecurity     Worried about Running Out of Food in the Last Year: Often true     Ran Out of Food in the Last Year: Often true   Transportation Needs: Unmet Transportation Needs (11/29/2024)    Received from [facility]    Transportation Needs     Lack of Transportation: Yes   Social Connections: Unknown (8/5/2021)    Received from [facility]    Social Connections     Frequency of Communication with Friends and Family: Not asked     Frequency of Social Gatherings with Friends and

## 2024-12-03 NOTE — ED NOTES
Called [facility] to arrange transport; was on hold for 45 minutes. Arranged [employer] rides[employer]are to transport patient home. Trip approved per charge.      Nuvia Polanco  12/02/24 2052

## 2025-07-03 ENCOUNTER — OFFICE VISIT (OUTPATIENT)
Dept: SURGERY | Age: 75
End: 2025-07-03

## 2025-07-03 VITALS — HEIGHT: 68 IN | BODY MASS INDEX: 27.28 KG/M2 | WEIGHT: 180 LBS

## 2025-07-03 DIAGNOSIS — Z17.0 MALIGNANT NEOPLASM OF OVERLAPPING SITES OF BOTH BREASTS IN FEMALE, ESTROGEN RECEPTOR POSITIVE (HCC): ICD-10-CM

## 2025-07-03 DIAGNOSIS — C50.811 BILATERAL MALIGNANT NEOPLASM OF OVERLAPPING SITES OF BREAST IN FEMALE, UNSPECIFIED ESTROGEN RECEPTOR STATUS (HCC): Primary | ICD-10-CM

## 2025-07-03 DIAGNOSIS — C50.912 BREAST CANCER METASTASIZED TO AXILLARY LYMPH NODE, LEFT (HCC): ICD-10-CM

## 2025-07-03 DIAGNOSIS — C50.812 BILATERAL MALIGNANT NEOPLASM OF OVERLAPPING SITES OF BREAST IN FEMALE, UNSPECIFIED ESTROGEN RECEPTOR STATUS (HCC): Primary | ICD-10-CM

## 2025-07-03 DIAGNOSIS — C77.3 BREAST CANCER METASTASIZED TO AXILLARY LYMPH NODE, LEFT (HCC): ICD-10-CM

## 2025-07-03 DIAGNOSIS — C50.811 MALIGNANT NEOPLASM OF OVERLAPPING SITES OF BOTH BREASTS IN FEMALE, ESTROGEN RECEPTOR POSITIVE (HCC): ICD-10-CM

## 2025-07-03 DIAGNOSIS — C50.812 MALIGNANT NEOPLASM OF OVERLAPPING SITES OF BOTH BREASTS IN FEMALE, ESTROGEN RECEPTOR POSITIVE (HCC): ICD-10-CM

## 2025-07-03 NOTE — PROGRESS NOTES
stable chronic illnesses;    or  ?1undiagnosed new problem with uncertain prognosis;    or  ?1acute illness with systemic symptoms;    or  ?1acute complicated injury   Moderate  (Must meet the requirements of at least 1 out of 3 categories)  Category 1: Tests, documents, or independent historian(s)  ?Any combination of 3 from the following:   ?Review of prior external note(s) from each unique source*;  ?Review of the result(s) of each unique test*;  ?Ordering of each unique test*;  ?Assessment requiring an independent historian(s)    or  Category 2: Independent interpretation of tests   ?Independent interpretation of a test performed by another physician/other qualified health care professional (not separately reported);     or  Category 3: Discussion of management or test interpretation  ?Discussion of management or test interpretation with external physician/other qualified health care professional/appropriate source (not separately reported)   Moderate risk of morbidity from additional diagnostic testing or treatment  Examples only:  ?Prescription drug management   ?Decision regarding minor surgery with identified patient or procedure risk factors  ?Decision regarding elective major surgery without identified patient or procedure risk factors   ?Diagnosis or treatment significantly limited by social determinants of health       25508  16968 High High  ?1or more chronic illnesses with severe exacerbation, progression, or side effects of treatment;    or  ?1 acute or chronic illness or injury that poses a threat to life or bodily function   Extensive  (Must meet the requirements of at least 2 out of 3 categories)  Category 1: Tests, documents, or independent historian(s)  ?Any combination of 3 from the following:   ?Review of prior external note(s) from each unique source*;  ?Review of the result(s) of each unique test*;   ?Ordering of each unique test*;   ?Assessment requiring an independent historian(s)    or

## 2025-07-07 ENCOUNTER — TELEPHONE (OUTPATIENT)
Dept: CASE MANAGEMENT | Age: 75
End: 2025-07-07

## 2025-07-07 DIAGNOSIS — C50.812 MALIGNANT NEOPLASM OF OVERLAPPING SITES OF BOTH BREASTS IN FEMALE, ESTROGEN RECEPTOR POSITIVE (HCC): Primary | ICD-10-CM

## 2025-07-07 DIAGNOSIS — C50.811 MALIGNANT NEOPLASM OF OVERLAPPING SITES OF BOTH BREASTS IN FEMALE, ESTROGEN RECEPTOR POSITIVE (HCC): Primary | ICD-10-CM

## 2025-07-07 DIAGNOSIS — Z17.0 MALIGNANT NEOPLASM OF OVERLAPPING SITES OF BOTH BREASTS IN FEMALE, ESTROGEN RECEPTOR POSITIVE (HCC): Primary | ICD-10-CM

## 2025-07-07 SDOH — ECONOMIC STABILITY: HOUSING INSECURITY: IN THE LAST 12 MONTHS, WAS THERE A TIME WHEN YOU WERE NOT ABLE TO PAY THE MORTGAGE OR RENT ON TIME?: YES

## 2025-07-07 SDOH — SOCIAL STABILITY: SOCIAL NETWORK
IN A TYPICAL WEEK, HOW MANY TIMES DO YOU TALK ON THE PHONE WITH FAMILY, FRIENDS, OR NEIGHBORS?: MORE THAN THREE TIMES A WEEK

## 2025-07-07 SDOH — SOCIAL STABILITY: SOCIAL INSECURITY: ARE YOU MARRIED, WIDOWED, DIVORCED, SEPARATED, NEVER MARRIED, OR LIVING WITH A PARTNER?: WIDOWED

## 2025-07-07 SDOH — HEALTH STABILITY: PHYSICAL HEALTH: ON AVERAGE, HOW MANY DAYS PER WEEK DO YOU ENGAGE IN MODERATE TO STRENUOUS EXERCISE (LIKE A BRISK WALK)?: 5 DAYS

## 2025-07-07 SDOH — HEALTH STABILITY: MENTAL HEALTH
DO YOU FEEL STRESS - TENSE, RESTLESS, NERVOUS, OR ANXIOUS, OR UNABLE TO SLEEP AT NIGHT BECAUSE YOUR MIND IS TROUBLED ALL THE TIME - THESE DAYS?: NOT AT ALL

## 2025-07-07 SDOH — ECONOMIC STABILITY: FOOD INSECURITY: WITHIN THE PAST 12 MONTHS, YOU WORRIED THAT YOUR FOOD WOULD RUN OUT BEFORE YOU GOT THE MONEY TO BUY MORE.: OFTEN TRUE

## 2025-07-07 SDOH — SOCIAL STABILITY: SOCIAL INSECURITY: WITHIN THE LAST YEAR, HAVE YOU BEEN AFRAID OF YOUR PARTNER OR EX-PARTNER?: NO

## 2025-07-07 SDOH — ECONOMIC STABILITY: FOOD INSECURITY: WITHIN THE PAST 12 MONTHS, THE FOOD YOU BOUGHT JUST DIDN'T LAST AND YOU DIDN'T HAVE MONEY TO GET MORE.: OFTEN TRUE

## 2025-07-07 SDOH — SOCIAL STABILITY: SOCIAL NETWORK: HOW OFTEN DO YOU GET TOGETHER WITH FRIENDS OR RELATIVES?: MORE THAN THREE TIMES A WEEK

## 2025-07-07 SDOH — HEALTH STABILITY: MENTAL HEALTH: HOW OFTEN DO YOU HAVE A DRINK CONTAINING ALCOHOL?: NEVER

## 2025-07-07 SDOH — HEALTH STABILITY: PHYSICAL HEALTH: ON AVERAGE, HOW MANY MINUTES DO YOU ENGAGE IN EXERCISE AT THIS LEVEL?: 30 MIN

## 2025-07-07 SDOH — HEALTH STABILITY: MENTAL HEALTH: HOW MANY DRINKS CONTAINING ALCOHOL DO YOU HAVE ON A TYPICAL DAY WHEN YOU ARE DRINKING?: PATIENT DOES NOT DRINK

## 2025-07-07 SDOH — SOCIAL STABILITY: SOCIAL INSECURITY
WITHIN THE LAST YEAR, HAVE YOU BEEN KICKED, HIT, SLAPPED, OR OTHERWISE PHYSICALLY HURT BY YOUR PARTNER OR EX-PARTNER?: NO

## 2025-07-07 SDOH — ECONOMIC STABILITY: FOOD INSECURITY: HOW HARD IS IT FOR YOU TO PAY FOR THE VERY BASICS LIKE FOOD, HOUSING, MEDICAL CARE, AND HEATING?: VERY HARD

## 2025-07-07 SDOH — SOCIAL STABILITY: SOCIAL INSECURITY
WITHIN THE LAST YEAR, HAVE YOU BEEN RAPED OR FORCED TO HAVE ANY KIND OF SEXUAL ACTIVITY BY YOUR PARTNER OR EX-PARTNER?: NO

## 2025-07-07 SDOH — ECONOMIC STABILITY: TRANSPORTATION INSECURITY: IN THE PAST 12 MONTHS, HAS LACK OF TRANSPORTATION KEPT YOU FROM MEDICAL APPOINTMENTS OR FROM GETTING MEDICATIONS?: YES

## 2025-07-07 ASSESSMENT — ACTIVITIES OF DAILY LIVING (ADL): LACK_OF_TRANSPORTATION: YES

## 2025-07-07 ASSESSMENT — PATIENT HEALTH QUESTIONNAIRE - PHQ9
1. LITTLE INTEREST OR PLEASURE IN DOING THINGS: NOT AT ALL
SUM OF ALL RESPONSES TO PHQ QUESTIONS 1-9: 0
2. FEELING DOWN, DEPRESSED OR HOPELESS: NOT AT ALL
SUM OF ALL RESPONSES TO PHQ QUESTIONS 1-9: 0
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
## 2025-07-07 NOTE — PROGRESS NOTES
NEW PATIENT ABSTRACT      Referral Diagnosis: Bilateral breast cancer     Referring Provider: Dr Mckinley Maguire     Primary Care Provider: Angelo Lyon MD    Presenting Symptoms: Palpable mass    Family History of Cancer: the patient was adopted and does not know her family history. Consult with Britany Mercedes planned 7/8       Past Medical History:   Past Medical History:   Diagnosis Date    Diabetes mellitus (HCC)     Hypertension          Most Recent Tests: Work up at [facility]     6/19/2025  Diagnostic Mammogram and/or Ultrasound      6/23/2025  Pathology from core biopsy  Right breast, 8 o'clock, 8 cm from nipple (bone clip), ultrasound-guided biopsy:  Invasive ductal carcinoma  Estrogen Receptor:  Intensity: Strong  Percent positive: 91.9%  Status of internal control: Internal control present and stains as expected  Interpretation: POSITIVE     Progesterone Receptor:  Intensity: Strong  Percent positive: 92.2%  Status of internal control: Internal control present and stains as expected  Interpretation: POSITIVE     Her2/Evelia:  Score: 1+ (Incomplete membrane staining that is faint/barely perceptible in >10% of tumor cells; HER2 LOW)  CAP/ASCO Interpretation: NEGATIVE     A. Left breast, 4 o'clock, 14 cm from nipple (bone clip), ultrasound-guided biopsy:  Invasive ductal carcinoma with necrosis   See synoptic report     B. Left axillary lymph node (T-shaped clip), ultrasound-guided biopsy:  Macrometastatic mammary carcinoma with necrosis  Largest focus measures 8 mm  No definitive extranodal extension   Estrogen Receptor:  Intensity: Weak  Percent positive: 8.6%  Status of internal control: Internal control present and stains as expected  Interpretation: LOW POSITIVE     Progesterone Receptor:  Intensity: N/A  Percent positive: 0.0%  Status of internal control: Internal control absent; external control stains as expected  Interpretation: NEGATIVE     Her2/Evelia:  Score: 0+ (Membrane staining that is

## 2025-07-10 ENCOUNTER — TELEPHONE (OUTPATIENT)
Dept: ONCOLOGY | Age: 75
End: 2025-07-10

## 2025-07-10 ENCOUNTER — OFFICE VISIT (OUTPATIENT)
Dept: ONCOLOGY | Age: 75
End: 2025-07-10
Payer: COMMERCIAL

## 2025-07-10 ENCOUNTER — HOSPITAL ENCOUNTER (OUTPATIENT)
Dept: LAB | Age: 75
Discharge: HOME | End: 2025-07-10
Payer: COMMERCIAL

## 2025-07-10 VITALS
OXYGEN SATURATION: 96 % | BODY MASS INDEX: 26.98 KG/M2 | HEIGHT: 68 IN | DIASTOLIC BLOOD PRESSURE: 83 MMHG | WEIGHT: 178 LBS | SYSTOLIC BLOOD PRESSURE: 152 MMHG | RESPIRATION RATE: 16 BRPM | TEMPERATURE: 98 F | HEART RATE: 80 BPM

## 2025-07-10 DIAGNOSIS — C50.912 BREAST CANCER METASTASIZED TO AXILLARY LYMPH NODE, LEFT (HCC): ICD-10-CM

## 2025-07-10 DIAGNOSIS — I48.91 ATRIAL FIB/FLUTTER, TRANSIENT (HCC): ICD-10-CM

## 2025-07-10 DIAGNOSIS — C77.3 BREAST CANCER METASTASIZED TO AXILLARY LYMPH NODE, LEFT (HCC): ICD-10-CM

## 2025-07-10 DIAGNOSIS — C50.811 MALIGNANT NEOPLASM OF OVERLAPPING SITES OF BOTH BREASTS IN FEMALE, ESTROGEN RECEPTOR POSITIVE (HCC): ICD-10-CM

## 2025-07-10 DIAGNOSIS — C50.911 BILATERAL MALIGNANT NEOPLASM OF BREAST IN FEMALE, UNSPECIFIED ESTROGEN RECEPTOR STATUS, UNSPECIFIED SITE OF BREAST (HCC): Primary | ICD-10-CM

## 2025-07-10 DIAGNOSIS — I48.92 ATRIAL FIB/FLUTTER, TRANSIENT (HCC): ICD-10-CM

## 2025-07-10 DIAGNOSIS — Z79.899 ENCOUNTER FOR MONITORING CARDIOTOXIC DRUG THERAPY: ICD-10-CM

## 2025-07-10 DIAGNOSIS — C50.919 TRIPLE NEGATIVE BREAST CANCER (HCC): ICD-10-CM

## 2025-07-10 DIAGNOSIS — C50.911 HORMONE RECEPTOR POSITIVE MALIGNANT NEOPLASM OF BREAST, RIGHT (HCC): ICD-10-CM

## 2025-07-10 DIAGNOSIS — Z17.0 MALIGNANT NEOPLASM OF OVERLAPPING SITES OF BOTH BREASTS IN FEMALE, ESTROGEN RECEPTOR POSITIVE (HCC): ICD-10-CM

## 2025-07-10 DIAGNOSIS — Z17.421 TRIPLE NEGATIVE BREAST CANCER (HCC): ICD-10-CM

## 2025-07-10 DIAGNOSIS — C50.912 BILATERAL MALIGNANT NEOPLASM OF BREAST IN FEMALE, UNSPECIFIED ESTROGEN RECEPTOR STATUS, UNSPECIFIED SITE OF BREAST (HCC): Primary | ICD-10-CM

## 2025-07-10 DIAGNOSIS — Z51.81 ENCOUNTER FOR MONITORING CARDIOTOXIC DRUG THERAPY: ICD-10-CM

## 2025-07-10 DIAGNOSIS — C50.812 MALIGNANT NEOPLASM OF OVERLAPPING SITES OF BOTH BREASTS IN FEMALE, ESTROGEN RECEPTOR POSITIVE (HCC): ICD-10-CM

## 2025-07-10 LAB
ALBUMIN SERPL-MCNC: 4.2 G/DL (ref 3.2–4.6)
ALBUMIN/GLOB SERPL: 1.2 (ref 1–1.9)
ALP SERPL-CCNC: 101 U/L (ref 35–104)
ALT SERPL-CCNC: 13 U/L (ref 8–45)
ANION GAP SERPL CALC-SCNC: 12 MMOL/L (ref 7–16)
AST SERPL-CCNC: 21 U/L (ref 15–37)
BASOPHILS # BLD: 0.04 K/UL (ref 0–0.2)
BASOPHILS NFR BLD: 0.7 % (ref 0–2)
BILIRUB SERPL-MCNC: 0.9 MG/DL (ref 0–1.2)
BUN SERPL-MCNC: 18 MG/DL (ref 8–23)
CALCIUM SERPL-MCNC: 9.9 MG/DL (ref 8.8–10.2)
CHLORIDE SERPL-SCNC: 105 MMOL/L (ref 98–107)
CO2 SERPL-SCNC: 24 MMOL/L (ref 20–29)
COLLECTION INFORMATION: NORMAL
CREAT SERPL-MCNC: 0.9 MG/DL (ref 0.6–1.1)
DATE SENT TO REF LAB: NORMAL
DIFFERENTIAL METHOD BLD: NORMAL
EOSINOPHIL # BLD: 0.09 K/UL (ref 0–0.8)
EOSINOPHIL NFR BLD: 1.6 % (ref 0.5–7.8)
ERYTHROCYTE [DISTWIDTH] IN BLOOD BY AUTOMATED COUNT: 13.7 % (ref 11.9–14.6)
GLOBULIN SER CALC-MCNC: 3.6 G/DL (ref 2.3–3.5)
GLUCOSE SERPL-MCNC: 113 MG/DL (ref 70–99)
HAV IGM SER QL: NONREACTIVE
HBV CORE IGM SER QL: NONREACTIVE
HBV SURFACE AB SERPL IA-ACNC: <3.5 MIU/ML
HBV SURFACE AG SER QL: NONREACTIVE
HBV SURFACE AG SER QL: NONREACTIVE
HCT VFR BLD AUTO: 42.1 % (ref 35.8–46.3)
HCV AB SER QL: NONREACTIVE
HCV AB SER QL: NONREACTIVE
HGB BLD-MCNC: 13.5 G/DL (ref 11.7–15.4)
IMM GRANULOCYTES # BLD AUTO: 0.01 K/UL (ref 0–0.5)
IMM GRANULOCYTES NFR BLD AUTO: 0.2 % (ref 0–5)
LYMPHOCYTES # BLD: 1.67 K/UL (ref 0.5–4.6)
LYMPHOCYTES NFR BLD: 29.3 % (ref 13–44)
Lab: NORMAL
MCH RBC QN AUTO: 29.5 PG (ref 26.1–32.9)
MCHC RBC AUTO-ENTMCNC: 32.1 G/DL (ref 31.4–35)
MCV RBC AUTO: 92.1 FL (ref 82–102)
MONOCYTES # BLD: 0.34 K/UL (ref 0.1–1.3)
MONOCYTES NFR BLD: 6 % (ref 4–12)
NEUTS SEG # BLD: 3.55 K/UL (ref 1.7–8.2)
NEUTS SEG NFR BLD: 62.2 % (ref 43–78)
NRBC # BLD: 0 K/UL (ref 0–0.2)
PLATELET # BLD AUTO: 192 K/UL (ref 150–450)
PMV BLD AUTO: 9.7 FL (ref 9.4–12.3)
POTASSIUM SERPL-SCNC: 4.3 MMOL/L (ref 3.5–5.1)
PROT SERPL-MCNC: 7.8 G/DL (ref 6.3–8.2)
RBC # BLD AUTO: 4.57 M/UL (ref 4.05–5.2)
SODIUM SERPL-SCNC: 141 MMOL/L (ref 136–145)
WBC # BLD AUTO: 5.7 K/UL (ref 4.3–11.1)

## 2025-07-10 PROCEDURE — 80053 COMPREHEN METABOLIC PANEL: CPT

## 2025-07-10 PROCEDURE — 86706 HEP B SURFACE ANTIBODY: CPT

## 2025-07-10 PROCEDURE — 99205 OFFICE O/P NEW HI 60 MIN: CPT | Performed by: STUDENT IN AN ORGANIZED HEALTH CARE EDUCATION/TRAINING PROGRAM

## 2025-07-10 PROCEDURE — 80074 ACUTE HEPATITIS PANEL: CPT

## 2025-07-10 PROCEDURE — G2211 COMPLEX E/M VISIT ADD ON: HCPCS | Performed by: STUDENT IN AN ORGANIZED HEALTH CARE EDUCATION/TRAINING PROGRAM

## 2025-07-10 PROCEDURE — 1123F ACP DISCUSS/DSCN MKR DOCD: CPT | Performed by: STUDENT IN AN ORGANIZED HEALTH CARE EDUCATION/TRAINING PROGRAM

## 2025-07-10 PROCEDURE — 87340 HEPATITIS B SURFACE AG IA: CPT

## 2025-07-10 PROCEDURE — 36415 COLL VENOUS BLD VENIPUNCTURE: CPT

## 2025-07-10 PROCEDURE — 86803 HEPATITIS C AB TEST: CPT

## 2025-07-10 PROCEDURE — 86704 HEP B CORE ANTIBODY TOTAL: CPT

## 2025-07-10 PROCEDURE — 85025 COMPLETE CBC W/AUTO DIFF WBC: CPT

## 2025-07-10 ASSESSMENT — PATIENT HEALTH QUESTIONNAIRE - PHQ9
SUM OF ALL RESPONSES TO PHQ QUESTIONS 1-9: 0
SUM OF ALL RESPONSES TO PHQ QUESTIONS 1-9: 0
1. LITTLE INTEREST OR PLEASURE IN DOING THINGS: NOT AT ALL
SUM OF ALL RESPONSES TO PHQ QUESTIONS 1-9: 0
2. FEELING DOWN, DEPRESSED OR HOPELESS: NOT AT ALL
SUM OF ALL RESPONSES TO PHQ QUESTIONS 1-9: 0

## 2025-07-10 NOTE — PATIENT INSTRUCTIONS
Patient Information from Today's Visit    The members of your Oncology Medical Home are listed below:    Physician Provider: Lorena Kohler Medical Oncologist  Advanced Practice Clinician: N/A  Registered Nurse: Patsy MOE RN  Nurse Navigator: Zuri ISSA RN  Medical Assistant: Lan SIDDIQUI MA  : Ann-Marie CRUZ  Supportive Care Services: Marge LEIJA LMSW    Diagnosis (Information Sheet Provided on Day of Diagnosis): breast cancer    Follow Up Instructions: 2 weeks to review results from scans and labs    It is strongly recommended that you restart your Eliquis  An order for echocardiogram has been ordered to be completed prior to starting any treatment   An MRI of your brain has been ordered     Has Treatment Plan Been Finalized? No    Current Labs: labs after visit      Please refer to After Visit Summary or [facility] for upcoming appointment information. Please call our office for rescheduling needs at least 24 hours before your scheduled appointment time.If you have any questions regarding your upcoming schedule please reach out to your care team through [facility] or call [phone].     Please notify your assigned Nurse Navigator of any unplanned hospital admissions or Emergency Room visits within 24 hours of discharge.    -------------------------------------------------------------------------------------------------------------------  Please call our office at [phone] if you have any  of the following symptoms:   Fever of 100.5 or greater  Chills  Shortness of breath  Swelling or pain in one leg    After office hours an answering service is available and will contact a provider for emergencies or if you are experiencing any of the above symptoms.          Patsy Forte RN

## 2025-07-10 NOTE — TELEPHONE ENCOUNTER
Called to instruct her to try Tylenol per Dr. Kohler, and if that doesn't ease the pain, she will prescribe pain medication. Phone went straight to .  is not setup, so unable to LVM.

## 2025-07-10 NOTE — PROGRESS NOTES
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 Pierre is a 75 y.o. female who was referred to me for evaluation of bilateral breast cancer.    Patient says she felt a lump last when this evaluation started.  Intermittent pain in the left breast but does not want any pain medication for it.    Pain 5-6/10, ECOG 1/2    Medical history significant for multiple back surgeries and neck surgery.  Multiple joint pains.  Also has diabetes, high blood pressure.  Sees cardiologist for A-fib and was put on Eliquis but patient stopped on her own thinking that this might have caused her to have a cancer.  I reemphasized her taking Eliquis is very important given that she is at high risk of developing stroke with A-fib and current cancer.  She voiced understanding and was planning to restart the medication.    [place] without [place] or electricity, she says she pays bill for herself her son who has brain damage from accident and his girlfriend from her Social Security.  She mentions that she is trying to get help from friends and other people to see if she can get a place to stay so she can proceed with the treatment.  Mentioned that our breast navigator Zuri is helping her as well.    Denies smoking, alcohol, recreational drug use.      Workup at [facility]:    6/19/2025 - bilateral mammo with ultrasound breast   3.1 cm highly suspicious mass corresponding to the palpable lump in the 4:00 left breast, 14 cm from the nipple.  Left axillary lymphadenopathy.  Ill-defined hypoechoic mass like area in the 8:00 right breast measuring approximately 1.5 cm.    Biopsy on

## 2025-07-11 ENCOUNTER — TELEPHONE (OUTPATIENT)
Dept: CASE MANAGEMENT | Age: 75
End: 2025-07-11

## 2025-07-11 ENCOUNTER — TELEPHONE (OUTPATIENT)
Dept: ONCOLOGY | Age: 75
End: 2025-07-11

## 2025-07-11 LAB — HBV CORE AB SERPL QL IA: NEGATIVE

## 2025-07-11 NOTE — TELEPHONE ENCOUNTER
Called the patient to follow up after her initial oncology visit yesterday. Dr Kohler has recommended Keynote chemotherapy. Prior to that planning the patient will complete MRI scheduled 7/16 and PET scan scheduled 7/16. I have called her and let her know port placement and MRI brain is planned 7/18. She was unable to see Marge AREVALO yesterday and I have sent Marge a message as the patient's cell phone will be turned off after today and also asked for assistance with a ride for follow up on 7/24 as Hany cannot bring the patient that day. The patient has asked me to call her neighbor Hany that drives her to appointments ([phone]) and review upcoming appointments. Navigation will review chart for imaging completion and patient has follow up planned with Dr Kohler 7/24.

## 2025-07-11 NOTE — TELEPHONE ENCOUNTER
Encounter Type: Telephone Follow-Up    Subjective:   contacted the patient to follow up after a missed appointment (missed by the ) the previous day. During the call, the patient shared that she is currently residing in a shed located on her son's property. Her son lives in an RV nearby. The patient reported receiving SNAP benefits, but is unable to store perishable food due to lack of refrigeration. She stated that the only electricity available is through a generator, and she must transport water between another location and the shed. The patient also shared that her phone service is at risk of disconnection on the 12th due to a past-due bill.    Objective:  Patient was alert and oriented during the call. She was receptive to support and demonstrated insight into her current challenges. No acute safety concerns reported at this time.    Assessment:  Patient is experiencing significant housing instability and lacks access to basic utilities, including electricity, running water, and cold food storage. These conditions are contributing to nutritional insecurity and increased vulnerability. Impending loss of phone service may further limit access to resources and communication.  Plan/Intervention:  Followed up on previously submitted referral.  Referred patient to [email or web address] and instructed her to complete a VI-SPDAT to be considered for housing assistance across the [facility].  Referred to [employer] in [place] for emergency food support.  Referred to [facility] for potential temporary housing.  Referred to [employer] to explore assistance with phone bill due before 7/12.   provided contact information for the above and will continue to monitor and support patient as needed.     admitted for back pain

## 2025-07-16 ENCOUNTER — HOSPITAL ENCOUNTER (OUTPATIENT)
Dept: MRI IMAGING | Age: 75
Discharge: HOME | End: 2025-07-19
Attending: SURGERY
Payer: COMMERCIAL

## 2025-07-16 ENCOUNTER — HOSPITAL ENCOUNTER (OUTPATIENT)
Dept: PET IMAGING | Age: 75
Discharge: HOME | End: 2025-07-19
Payer: COMMERCIAL

## 2025-07-16 DIAGNOSIS — C77.3 BREAST CANCER METASTASIZED TO AXILLARY LYMPH NODE, LEFT (HCC): ICD-10-CM

## 2025-07-16 DIAGNOSIS — C50.812 BILATERAL MALIGNANT NEOPLASM OF OVERLAPPING SITES OF BREAST IN FEMALE, UNSPECIFIED ESTROGEN RECEPTOR STATUS (HCC): ICD-10-CM

## 2025-07-16 DIAGNOSIS — C50.811 BILATERAL MALIGNANT NEOPLASM OF OVERLAPPING SITES OF BREAST IN FEMALE, UNSPECIFIED ESTROGEN RECEPTOR STATUS (HCC): ICD-10-CM

## 2025-07-16 DIAGNOSIS — C50.912 BREAST CANCER METASTASIZED TO AXILLARY LYMPH NODE, LEFT (HCC): ICD-10-CM

## 2025-07-16 LAB
GLUCOSE BLD STRIP.AUTO-MCNC: 113 MG/DL (ref 65–100)
SERVICE CMNT-IMP: ABNORMAL

## 2025-07-16 PROCEDURE — 2500000003 HC RX 250 WO HCPCS: Performed by: SURGERY

## 2025-07-16 PROCEDURE — 3430000000 HC RX DIAGNOSTIC RADIOPHARMACEUTICAL: Performed by: SURGERY

## 2025-07-16 PROCEDURE — 78815 PET IMAGE W/CT SKULL-THIGH: CPT

## 2025-07-16 PROCEDURE — A9609 HC RX DIAGNOSTIC RADIOPHARMACEUTICAL: HCPCS | Performed by: SURGERY

## 2025-07-16 PROCEDURE — 6360000004 HC RX CONTRAST MEDICATION: Performed by: SURGERY

## 2025-07-16 PROCEDURE — A9579 GAD-BASE MR CONTRAST NOS,1ML: HCPCS | Performed by: SURGERY

## 2025-07-16 PROCEDURE — 82962 GLUCOSE BLOOD TEST: CPT

## 2025-07-16 PROCEDURE — C8908 MRI W/O FOL W/CONT, BREAST,: HCPCS

## 2025-07-16 RX ORDER — GADOTERIDOL 279.3 MG/ML
16 INJECTION INTRAVENOUS
Status: COMPLETED | OUTPATIENT
Start: 2025-07-16 | End: 2025-07-16

## 2025-07-16 RX ORDER — DIATRIZOATE MEGLUMINE AND DIATRIZOATE SODIUM 660; 100 MG/ML; MG/ML
10 SOLUTION ORAL; RECTAL
Status: DISCONTINUED | OUTPATIENT
Start: 2025-07-16 | End: 2025-07-20 | Stop reason: HOSPADM

## 2025-07-16 RX ORDER — SODIUM CHLORIDE 0.9 % (FLUSH) 0.9 %
20 SYRINGE (ML) INJECTION AS NEEDED
Status: DISCONTINUED | OUTPATIENT
Start: 2025-07-16 | End: 2025-07-20 | Stop reason: HOSPADM

## 2025-07-16 RX ORDER — FLUDEOXYGLUCOSE F 18 200 MCI/ML
10.14 INJECTION, SOLUTION INTRAVENOUS
Status: COMPLETED | OUTPATIENT
Start: 2025-07-16 | End: 2025-07-16

## 2025-07-16 RX ADMIN — SODIUM CHLORIDE, PRESERVATIVE FREE 20 ML: 5 INJECTION INTRAVENOUS at 11:50

## 2025-07-16 RX ADMIN — DIATRIZOATE MEGLUMINE AND DIATRIZOATE SODIUM 10 ML: 660; 100 LIQUID ORAL; RECTAL at 11:50

## 2025-07-16 RX ADMIN — GADOTERIDOL 16 ML: 279.3 INJECTION, SOLUTION INTRAVENOUS at 15:14

## 2025-07-16 RX ADMIN — FLUDEOXYGLUCOSE F 18 10.14 MILLICURIE: 200 INJECTION, SOLUTION INTRAVENOUS at 11:50

## 2025-07-17 ENCOUNTER — CLINICAL DOCUMENTATION (OUTPATIENT)
Dept: ONCOLOGY | Age: 75
End: 2025-07-17

## 2025-07-17 ENCOUNTER — CLINICAL DOCUMENTATION (OUTPATIENT)
Dept: CASE MANAGEMENT | Age: 75
End: 2025-07-17

## 2025-07-17 NOTE — PROGRESS NOTES
Presented at Multidisciplinary Breast Conference today 7/17/2025.  MRI brain and port planned for 7/18. She has planned follow up with Dr Kohler 7/24. Navigation will continue to follow for plan of care.

## 2025-07-17 NOTE — PROGRESS NOTES
Attempted to call Ms. Pierre in order to disclose that her initial testing, the 13 gene panel of genes that may influence breast cancer management came back normal. Her phone did not ring or allow voicemail.  Her full broad panel results are still pending. I will attempt again at a later date.

## 2025-07-18 ENCOUNTER — CLINICAL DOCUMENTATION (OUTPATIENT)
Dept: CASE MANAGEMENT | Age: 75
End: 2025-07-18

## 2025-07-18 ENCOUNTER — HOSPITAL ENCOUNTER (OUTPATIENT)
Dept: INTERVENTIONAL RADIOLOGY/VASCULAR | Age: 75
Discharge: HOME | End: 2025-07-20
Attending: STUDENT IN AN ORGANIZED HEALTH CARE EDUCATION/TRAINING PROGRAM

## 2025-07-18 DIAGNOSIS — Z17.0 MALIGNANT NEOPLASM OF OVERLAPPING SITES OF BOTH BREASTS IN FEMALE, ESTROGEN RECEPTOR POSITIVE (HCC): ICD-10-CM

## 2025-07-18 DIAGNOSIS — C50.812 MALIGNANT NEOPLASM OF OVERLAPPING SITES OF BOTH BREASTS IN FEMALE, ESTROGEN RECEPTOR POSITIVE (HCC): ICD-10-CM

## 2025-07-18 DIAGNOSIS — C50.811 MALIGNANT NEOPLASM OF OVERLAPPING SITES OF BOTH BREASTS IN FEMALE, ESTROGEN RECEPTOR POSITIVE (HCC): ICD-10-CM

## 2025-07-18 NOTE — PROGRESS NOTES
Patient was discussed in tumor board yesterday. She is in need of housing assistance in order to proceed with treatment. Message was sent to Marge AREVALO to see if we can obtain a suitable option for the patient. MRI brain and port planned for 7/18. She has planned follow up with Dr Kohler 7/24. Navigation will continue to follow for plan of care.

## 2025-07-21 ENCOUNTER — CLINICAL DOCUMENTATION (OUTPATIENT)
Dept: ONCOLOGY | Age: 75
End: 2025-07-21

## 2025-07-21 NOTE — PROGRESS NOTES
Social Work Note     Date of Service:TD@10:30am     Reason for Visit: Transportation and Housing     Note: MICKEY received message regarding transportation for appointment on 7/24/25 as well as the need for housing during treatment. MICKEY scheduled transportation via [employer], please see recent note. MICKEY then completed telephone call with Katie to inform her that the transportation was scheduled but that she needed transportation on 7/22/25 for her port placement. She indicated she believed her friend Hany, [phone], would take her to that and that he requested a call from someone at [facility] to confirm the need for transportation. MICKEY will complete that call.    MICKEY inquired if Katie had reached out to [facility] as that contact information was provided by Marge Mueller, , on 7/11/25. She reported that she called them and they have a bed but they have to get in line for a bed every single day which would not work for her. She states she did call \"[facility] [place] [facility]\" and it takes between 3 months and a year to find an apartment. States she went somewhere in [place] that said she needs to make $2,000 to qualify, somewhere in [place] that has tiny homes but only help women with children. Finally, she is working with a woman, Amalia, at [facility] [employer] that will set her up on a piece of land, $200-300/month for the land and $200 for the house. She is supposed to hear back in the next few days, state she is already approved and that they are looking for land in [place] and [facility].    MICKEY did call Hany, [phone], to discuss her transportation need for 7/22 for her port placement, however, he reported that he is not available tomorrow and she is planning to cancel the appointment. MICKEY informed Zuri Fernández RN of these updates.    MICKEY also contacted [employer] to update her phone number at her request, ([phone]).     Next Steps: MICKEY provided pt with SW contact

## 2025-07-21 NOTE — PROGRESS NOTES
Date: 7/21/2025    Patient: Katie Pierre  YOB: 1950    Provider: Britany Mercedes MS YUAN [place], you were previously referred by Mckinley Maguire MD for an initial genetic consultation due to your personal cancer history.  You were seen on July 8th, 2025 and consented to genetic testing, which was sent to [facility]. I called you to discuss the results of this testing, but have unfortunately been unable to reach you. This letter is a summary of the results.     Results  During our initial meeting, we reviewed your personal and family history. Following discussion of your  history we discussed the benefits, limitations and possible impacts of genetic testing and you pursued a panel called [employer] looking at 85 genes in which pathogenic variants (harmful genetic differences) have been related to increased risk for cancer. The genes included in this panel were AIP, ALK, APC, KRYSTIAN, BAP1, BARD1, BMPR1A, BRCA1, BRCA2, BRIP1, CDC73, CDH1, CDK4, CDKN1B, CDKN2A, CEBPA, CHEK2, DICER1, ETV6, FH, FLCN, GATA2, KIF1B, LZTR1, MAX, MEN1, MET, MLH1, MSH2, MSH6, MUTYH, NF1, NF2, NTHL1, PALB2, PHOX2B, PMS2, POT1, BMZTY4A, PTCH1, PTEN, RAD51C, RAD51D, RB1, RET, RUNX1, SDHA, SDHAF2, SDHB, SDHC, SDHD, SMAD4, SMARCA4, SMARCB1, SMARCE1, STK11, SUFU, HCPZ389, TP53, TSC1, TSC2, VHL, WT1 , ATRIP, AXIN2, CTNNA1, DDX41, EGFR, EGLN1, HOXB13, KIT, MBD4, MITF, MLH3, MSH3, PALLD, PDGFRA, POLD1, POLE, RAD51B, RNF43, RPS20, TERT, EPCAM and GREM1.    This testing came back negative, meaning we did not identify a pathogenic variant that increases risk of developing cancer.     During our initial meeting we reviewed the potential reasons for a negative test result, and these include:  There is not a pathogenic variant in your family that increases risk of developing cancer, and all the cases of cancer in your family have been sporadic, or by chance.  There is a pathogenic variant in your family that

## 2025-07-21 NOTE — PROGRESS NOTES
Transportation Confirmation   Ride(s) arranged for the appointment date(s) below. Patient is scheduled to arrive prior to their first appointment time and will receive a message to coordinate will call transportation back once their visit is complete.      Date(s): 7/24/25   Transportation Service: [employer]    Patient notified of transportation arrangements via: My Chart   Should appointments be added, altered, or canceled please notify  [facility] so adjustments can be made or additional transports arranged.   *Please see below for RoundTrip receipt confirmation*     Confirmation number for [employer] trip (scheduled via telephone) [phone]  Patient can call, [phone], when ready for .

## 2025-07-22 LAB
CARIS ANDROGEN RECEPTOR: POSITIVE
CARIS ER: POSITIVE
CARIS GENOMIC LOH - EXOME: NORMAL
CARIS HER2/NEU: NEGATIVE
CARIS HLA-A: NORMAL
CARIS HLA-B: NORMAL
CARIS HLA-C: NORMAL
CARIS MSI - EXOME: NORMAL
CARIS PD-L1 (22C3): POSITIVE
CARIS PR: NEGATIVE
CARIS PTEN: POSITIVE
CARIS TMB - EXOME: NORMAL

## 2025-07-23 DIAGNOSIS — C50.912 BREAST CANCER METASTASIZED TO AXILLARY LYMPH NODE, LEFT (HCC): Primary | ICD-10-CM

## 2025-07-23 DIAGNOSIS — C50.811 MALIGNANT NEOPLASM OF OVERLAPPING SITES OF BOTH BREASTS IN FEMALE, ESTROGEN RECEPTOR POSITIVE (HCC): ICD-10-CM

## 2025-07-23 DIAGNOSIS — Z17.421 TRIPLE NEGATIVE BREAST CANCER (HCC): ICD-10-CM

## 2025-07-23 DIAGNOSIS — C77.3 BREAST CANCER METASTASIZED TO AXILLARY LYMPH NODE, LEFT (HCC): Primary | ICD-10-CM

## 2025-07-23 DIAGNOSIS — C50.911 HORMONE RECEPTOR POSITIVE MALIGNANT NEOPLASM OF BREAST, RIGHT (HCC): ICD-10-CM

## 2025-07-23 DIAGNOSIS — C50.812 MALIGNANT NEOPLASM OF OVERLAPPING SITES OF BOTH BREASTS IN FEMALE, ESTROGEN RECEPTOR POSITIVE (HCC): ICD-10-CM

## 2025-07-23 DIAGNOSIS — C50.919 TRIPLE NEGATIVE BREAST CANCER (HCC): ICD-10-CM

## 2025-07-23 DIAGNOSIS — Z17.0 MALIGNANT NEOPLASM OF OVERLAPPING SITES OF BOTH BREASTS IN FEMALE, ESTROGEN RECEPTOR POSITIVE (HCC): ICD-10-CM

## 2025-07-24 ENCOUNTER — OFFICE VISIT (OUTPATIENT)
Dept: ONCOLOGY | Age: 75
End: 2025-07-24
Payer: COMMERCIAL

## 2025-07-24 ENCOUNTER — HOSPITAL ENCOUNTER (OUTPATIENT)
Dept: LAB | Age: 75
Discharge: HOME | End: 2025-07-24
Payer: COMMERCIAL

## 2025-07-24 VITALS
HEART RATE: 85 BPM | BODY MASS INDEX: 27.89 KG/M2 | HEIGHT: 68 IN | DIASTOLIC BLOOD PRESSURE: 103 MMHG | RESPIRATION RATE: 18 BRPM | SYSTOLIC BLOOD PRESSURE: 189 MMHG | TEMPERATURE: 97.6 F | OXYGEN SATURATION: 99 % | WEIGHT: 184 LBS

## 2025-07-24 DIAGNOSIS — Z17.0 MALIGNANT NEOPLASM OF OVERLAPPING SITES OF BOTH BREASTS IN FEMALE, ESTROGEN RECEPTOR POSITIVE (HCC): ICD-10-CM

## 2025-07-24 DIAGNOSIS — C50.919 TRIPLE NEGATIVE BREAST CANCER (HCC): ICD-10-CM

## 2025-07-24 DIAGNOSIS — C50.911 BILATERAL MALIGNANT NEOPLASM OF BREAST IN FEMALE, UNSPECIFIED ESTROGEN RECEPTOR STATUS, UNSPECIFIED SITE OF BREAST (HCC): Primary | ICD-10-CM

## 2025-07-24 DIAGNOSIS — Z17.421 TRIPLE NEGATIVE BREAST CANCER (HCC): ICD-10-CM

## 2025-07-24 DIAGNOSIS — C50.912 BREAST CANCER METASTASIZED TO AXILLARY LYMPH NODE, LEFT (HCC): ICD-10-CM

## 2025-07-24 DIAGNOSIS — I48.91 ATRIAL FIB/FLUTTER, TRANSIENT (HCC): ICD-10-CM

## 2025-07-24 DIAGNOSIS — C50.811 MALIGNANT NEOPLASM OF OVERLAPPING SITES OF BOTH BREASTS IN FEMALE, ESTROGEN RECEPTOR POSITIVE (HCC): ICD-10-CM

## 2025-07-24 DIAGNOSIS — C77.3 BREAST CANCER METASTASIZED TO AXILLARY LYMPH NODE, LEFT (HCC): ICD-10-CM

## 2025-07-24 DIAGNOSIS — C50.911 HORMONE RECEPTOR POSITIVE MALIGNANT NEOPLASM OF BREAST, RIGHT (HCC): ICD-10-CM

## 2025-07-24 DIAGNOSIS — C50.912 BILATERAL MALIGNANT NEOPLASM OF BREAST IN FEMALE, UNSPECIFIED ESTROGEN RECEPTOR STATUS, UNSPECIFIED SITE OF BREAST (HCC): Primary | ICD-10-CM

## 2025-07-24 DIAGNOSIS — C50.812 MALIGNANT NEOPLASM OF OVERLAPPING SITES OF BOTH BREASTS IN FEMALE, ESTROGEN RECEPTOR POSITIVE (HCC): ICD-10-CM

## 2025-07-24 DIAGNOSIS — I48.92 ATRIAL FIB/FLUTTER, TRANSIENT (HCC): ICD-10-CM

## 2025-07-24 LAB
ALBUMIN SERPL-MCNC: 3.8 G/DL (ref 3.2–4.6)
ALBUMIN/GLOB SERPL: 1.1 (ref 1–1.9)
ALP SERPL-CCNC: 86 U/L (ref 35–104)
ALT SERPL-CCNC: 14 U/L (ref 8–45)
ANION GAP SERPL CALC-SCNC: 10 MMOL/L (ref 7–16)
AST SERPL-CCNC: 20 U/L (ref 15–37)
BASOPHILS # BLD: 0.03 K/UL (ref 0–0.2)
BASOPHILS NFR BLD: 0.5 % (ref 0–2)
BILIRUB SERPL-MCNC: 0.8 MG/DL (ref 0–1.2)
BUN SERPL-MCNC: 13 MG/DL (ref 8–23)
CALCIUM SERPL-MCNC: 9.6 MG/DL (ref 8.8–10.2)
CHLORIDE SERPL-SCNC: 104 MMOL/L (ref 98–107)
CO2 SERPL-SCNC: 28 MMOL/L (ref 20–29)
CREAT SERPL-MCNC: 0.97 MG/DL (ref 0.6–1.1)
DIFFERENTIAL METHOD BLD: NORMAL
EOSINOPHIL # BLD: 0.11 K/UL (ref 0–0.8)
EOSINOPHIL NFR BLD: 2 % (ref 0.5–7.8)
ERYTHROCYTE [DISTWIDTH] IN BLOOD BY AUTOMATED COUNT: 13.6 % (ref 11.9–14.6)
GLOBULIN SER CALC-MCNC: 3.4 G/DL (ref 2.3–3.5)
GLUCOSE SERPL-MCNC: 104 MG/DL (ref 70–99)
HCT VFR BLD AUTO: 37.5 % (ref 35.8–46.3)
HGB BLD-MCNC: 12.1 G/DL (ref 11.7–15.4)
HIV 1+2 AB+HIV1 P24 AG SERPL QL IA: NONREACTIVE
HIV 1/2 RESULT COMMENT: NORMAL
IMM GRANULOCYTES # BLD AUTO: 0.01 K/UL (ref 0–0.5)
IMM GRANULOCYTES NFR BLD AUTO: 0.2 % (ref 0–5)
LYMPHOCYTES # BLD: 1.46 K/UL (ref 0.5–4.6)
LYMPHOCYTES NFR BLD: 26.7 % (ref 13–44)
MCH RBC QN AUTO: 29.6 PG (ref 26.1–32.9)
MCHC RBC AUTO-ENTMCNC: 32.3 G/DL (ref 31.4–35)
MCV RBC AUTO: 91.7 FL (ref 82–102)
MONOCYTES # BLD: 0.42 K/UL (ref 0.1–1.3)
MONOCYTES NFR BLD: 7.7 % (ref 4–12)
NEUTS SEG # BLD: 3.44 K/UL (ref 1.7–8.2)
NEUTS SEG NFR BLD: 62.9 % (ref 43–78)
NRBC # BLD: 0 K/UL (ref 0–0.2)
PLATELET # BLD AUTO: 170 K/UL (ref 150–450)
PMV BLD AUTO: 9.5 FL (ref 9.4–12.3)
POTASSIUM SERPL-SCNC: 4.4 MMOL/L (ref 3.5–5.1)
PROT SERPL-MCNC: 7.1 G/DL (ref 6.3–8.2)
RBC # BLD AUTO: 4.09 M/UL (ref 4.05–5.2)
SODIUM SERPL-SCNC: 142 MMOL/L (ref 136–145)
WBC # BLD AUTO: 5.5 K/UL (ref 4.3–11.1)

## 2025-07-24 PROCEDURE — 87389 HIV-1 AG W/HIV-1&-2 AB AG IA: CPT

## 2025-07-24 PROCEDURE — 99214 OFFICE O/P EST MOD 30 MIN: CPT | Performed by: STUDENT IN AN ORGANIZED HEALTH CARE EDUCATION/TRAINING PROGRAM

## 2025-07-24 PROCEDURE — 85025 COMPLETE CBC W/AUTO DIFF WBC: CPT

## 2025-07-24 PROCEDURE — 80053 COMPREHEN METABOLIC PANEL: CPT

## 2025-07-24 PROCEDURE — 1123F ACP DISCUSS/DSCN MKR DOCD: CPT | Performed by: STUDENT IN AN ORGANIZED HEALTH CARE EDUCATION/TRAINING PROGRAM

## 2025-07-24 PROCEDURE — 36415 COLL VENOUS BLD VENIPUNCTURE: CPT

## 2025-07-24 ASSESSMENT — PATIENT HEALTH QUESTIONNAIRE - PHQ9
SUM OF ALL RESPONSES TO PHQ QUESTIONS 1-9: 0
2. FEELING DOWN, DEPRESSED OR HOPELESS: NOT AT ALL
1. LITTLE INTEREST OR PLEASURE IN DOING THINGS: NOT AT ALL

## 2025-07-24 NOTE — PROGRESS NOTES
axillary lymph node.  Abdominal:      General: There is no distension.      Palpations: Abdomen is soft.      Tenderness: There is no abdominal tenderness.   Musculoskeletal:      Right lower leg: No edema.      Left lower leg: No edema.   Skin:     General: Skin is warm and dry.   Neurological:      Mental Status: She is alert and oriented to person, place, and time. Mental status is at baseline.           Labs:  Recent Results (from the past 24 hours)   Comprehensive Metabolic Panel    Collection Time: 07/24/25  1:28 PM   Result Value Ref Range    Sodium 142 136 - 145 mmol/L    Potassium 4.4 3.5 - 5.1 mmol/L    Chloride 104 98 - 107 mmol/L    CO2 28 20 - 29 mmol/L    Anion Gap 10 7 - 16 mmol/L    Glucose 104 (H) 70 - 99 mg/dL    BUN 13 8 - 23 MG/DL    Creatinine 0.97 0.60 - 1.10 MG/DL    Est, Glom Filt Rate 61 >60 ml/min/1.73m2    Calcium 9.6 8.8 - 10.2 MG/DL    Total Bilirubin 0.8 0.0 - 1.2 MG/DL    ALT 14 8 - 45 U/L    AST 20 15 - 37 U/L    Alk Phosphatase 86 35 - 104 U/L    Total Protein 7.1 6.3 - 8.2 g/dL    Albumin 3.8 3.2 - 4.6 g/dL    Globulin 3.4 2.3 - 3.5 g/dL    Albumin/Globulin Ratio 1.1 1.0 - 1.9     CBC with Auto Differential    Collection Time: 07/24/25  1:28 PM   Result Value Ref Range    WBC 5.5 4.3 - 11.1 K/uL    RBC 4.09 4.05 - 5.2 M/uL    Hemoglobin 12.1 11.7 - 15.4 g/dL    Hematocrit 37.5 35.8 - 46.3 %    MCV 91.7 82.0 - 102.0 FL    MCH 29.6 26.1 - 32.9 PG    MCHC 32.3 31.4 - 35.0 g/dL    RDW 13.6 11.9 - 14.6 %    Platelets 170 150 - 450 K/uL    MPV 9.5 9.4 - 12.3 FL    nRBC 0.00 0.0 - 0.2 K/uL    Neutrophils % 62.9 43.0 - 78.0 %    Lymphocytes % 26.7 13.0 - 44.0 %    Monocytes % 7.7 4.0 - 12.0 %    Eosinophils % 2.0 0.5 - 7.8 %    Basophils % 0.5 0.0 - 2.0 %    Immature Granulocytes % 0.2 0.0 - 5.0 %    Neutrophils Absolute 3.44 1.70 - 8.20 K/UL    Lymphocytes Absolute 1.46 0.50 - 4.60 K/UL    Monocytes Absolute 0.42 0.10 - 1.30 K/UL    Eosinophils Absolute 0.11 0.00 - 0.80 K/UL    Basophils

## 2025-07-24 NOTE — PATIENT INSTRUCTIONS
Patient Information from Today's Visit    The members of your Oncology Medical Home are listed below:    Physician Provider: Dr. Lorena Kohler  Advanced Practice Clinician: Clara Hardin  Registered Nurse: Codie SIDDIQUI  Nurse Navigator: Zuri ISSA RN  Medical Assistant: Lan SIDDIQUI  : Ann-Marie CRUZ  Supportive Care Services: Marge LEIJAANDREI    Diagnosis (Information Sheet Provided on Day of Diagnosis): Breast    Follow Up Instructions:   -Follow up in [place], [place] on July 29th at 10 am as you reported to us since this will be closer to where you're living. They will order the PORT and treatment for your cancer OR you can have the port placed with [facility]. You can call to schedule this at [phone].   -You need to have an ultrasound on your heart called an ECHO. You can call to schedule this at [phone].     Labs:   Hospital Outpatient Visit on 07/24/2025   Component Date Value Ref Range Status    Sodium 07/24/2025 142  136 - 145 mmol/L Final    Potassium 07/24/2025 4.4  3.5 - 5.1 mmol/L Final    Chloride 07/24/2025 104  98 - 107 mmol/L Final    CO2 07/24/2025 28  20 - 29 mmol/L Final    Anion Gap 07/24/2025 10  7 - 16 mmol/L Final    Glucose 07/24/2025 104 (H)  70 - 99 mg/dL Final    Comment: <70 mg/dL Consistent with, but not fully diagnostic of hypoglycemia.  100 - 125 mg/dL Impaired fasting glucose/consistent with pre-diabetes mellitus.  > 126 mg/dl Fasting glucose consistent with overt diabetes mellitus      BUN 07/24/2025 13  8 - 23 MG/DL Final    Creatinine 07/24/2025 0.97  0.60 - 1.10 MG/DL Final    Est, Glom Filt Rate 07/24/2025 61  >60 ml/min/1.73m2 Final    Comment:    Pediatric calculator link: https://www.kidney.org/professionals/kdoqi/gfr_calculatorped     These results are not intended for use in patients <18 years of age.     eGFR results are calculated without a race factor using  the 2021 CKD-EPI equation. Careful clinical correlation is recommended, particularly when comparing to results

## 2025-07-25 ENCOUNTER — CLINICAL DOCUMENTATION (OUTPATIENT)
Dept: ONCOLOGY | Age: 75
End: 2025-07-25

## 2025-07-25 NOTE — PROGRESS NOTES
Review of notes from yesterday's visit with Dr. Kohler.  Patient has elected care closer to home with [facility] and has appointment there for follow up on Tuesday 7-29-25 per notes.  Navigation will be available as needed.

## 2025-07-31 ENCOUNTER — OFFICE VISIT (OUTPATIENT)
Dept: SURGERY | Age: 75
End: 2025-07-31

## 2025-07-31 DIAGNOSIS — C50.919 TRIPLE NEGATIVE BREAST CANCER (HCC): ICD-10-CM

## 2025-07-31 DIAGNOSIS — C50.912 BREAST CANCER METASTASIZED TO AXILLARY LYMPH NODE, LEFT (HCC): ICD-10-CM

## 2025-07-31 DIAGNOSIS — C77.3 BREAST CANCER METASTASIZED TO AXILLARY LYMPH NODE, LEFT (HCC): ICD-10-CM

## 2025-07-31 DIAGNOSIS — Z17.0 MALIGNANT NEOPLASM OF OVERLAPPING SITES OF BOTH BREASTS IN FEMALE, ESTROGEN RECEPTOR POSITIVE (HCC): Primary | ICD-10-CM

## 2025-07-31 DIAGNOSIS — C50.812 MALIGNANT NEOPLASM OF OVERLAPPING SITES OF BOTH BREASTS IN FEMALE, ESTROGEN RECEPTOR POSITIVE (HCC): Primary | ICD-10-CM

## 2025-07-31 DIAGNOSIS — Z17.421 TRIPLE NEGATIVE BREAST CANCER (HCC): ICD-10-CM

## 2025-07-31 DIAGNOSIS — C50.811 MALIGNANT NEOPLASM OF OVERLAPPING SITES OF BOTH BREASTS IN FEMALE, ESTROGEN RECEPTOR POSITIVE (HCC): Primary | ICD-10-CM

## 2025-08-12 ENCOUNTER — HOSPITAL ENCOUNTER (OUTPATIENT)
Dept: INTERVENTIONAL RADIOLOGY/VASCULAR | Age: 75
Discharge: HOME | End: 2025-08-14
Attending: SURGERY
Payer: MEDICARE

## 2025-08-12 VITALS
TEMPERATURE: 97.4 F | OXYGEN SATURATION: 91 % | BODY MASS INDEX: 27.89 KG/M2 | HEIGHT: 68 IN | DIASTOLIC BLOOD PRESSURE: 79 MMHG | HEART RATE: 98 BPM | WEIGHT: 184 LBS | RESPIRATION RATE: 16 BRPM | SYSTOLIC BLOOD PRESSURE: 149 MMHG

## 2025-08-12 DIAGNOSIS — C77.3 BREAST CANCER METASTASIZED TO AXILLARY LYMPH NODE, LEFT (HCC): ICD-10-CM

## 2025-08-12 DIAGNOSIS — C50.912 BREAST CANCER METASTASIZED TO AXILLARY LYMPH NODE, LEFT (HCC): ICD-10-CM

## 2025-08-12 DIAGNOSIS — Z17.421 TRIPLE NEGATIVE BREAST CANCER (HCC): ICD-10-CM

## 2025-08-12 DIAGNOSIS — C50.919 TRIPLE NEGATIVE BREAST CANCER (HCC): ICD-10-CM

## 2025-08-12 DIAGNOSIS — Z17.0 MALIGNANT NEOPLASM OF OVERLAPPING SITES OF BOTH BREASTS IN FEMALE, ESTROGEN RECEPTOR POSITIVE (HCC): ICD-10-CM

## 2025-08-12 DIAGNOSIS — C50.811 MALIGNANT NEOPLASM OF OVERLAPPING SITES OF BOTH BREASTS IN FEMALE, ESTROGEN RECEPTOR POSITIVE (HCC): ICD-10-CM

## 2025-08-12 DIAGNOSIS — C50.812 MALIGNANT NEOPLASM OF OVERLAPPING SITES OF BOTH BREASTS IN FEMALE, ESTROGEN RECEPTOR POSITIVE (HCC): ICD-10-CM

## 2025-08-12 LAB
GLUCOSE BLD STRIP.AUTO-MCNC: 134 MG/DL (ref 65–100)
SERVICE CMNT-IMP: ABNORMAL

## 2025-08-12 PROCEDURE — 6360000002 HC RX W HCPCS: Performed by: PHYSICIAN ASSISTANT

## 2025-08-12 PROCEDURE — 82962 GLUCOSE BLOOD TEST: CPT

## 2025-08-12 PROCEDURE — C1894 INTRO/SHEATH, NON-LASER: HCPCS

## 2025-08-12 PROCEDURE — 6360000002 HC RX W HCPCS: Performed by: RADIOLOGY

## 2025-08-12 RX ORDER — MUPIROCIN 2 %
OINTMENT (GRAM) TOPICAL
COMMUNITY
Start: 2025-07-22

## 2025-08-12 RX ORDER — FENTANYL CITRATE 50 UG/ML
INJECTION, SOLUTION INTRAMUSCULAR; INTRAVENOUS PRN
Status: COMPLETED | OUTPATIENT
Start: 2025-08-12 | End: 2025-08-12

## 2025-08-12 RX ORDER — HYDROCODONE BITARTRATE AND ACETAMINOPHEN 5; 325 MG/1; MG/1
1 TABLET ORAL EVERY 4 HOURS PRN
COMMUNITY
Start: 2024-11-29

## 2025-08-12 RX ORDER — BLOOD-GLUCOSE METER
EACH MISCELLANEOUS
COMMUNITY
Start: 2025-07-29

## 2025-08-12 RX ORDER — OXYBUTYNIN CHLORIDE 5 MG/1
5 TABLET ORAL DAILY
COMMUNITY

## 2025-08-12 RX ORDER — CLOTRIMAZOLE AND BETAMETHASONE DIPROPIONATE 10; .64 MG/G; MG/G
CREAM TOPICAL
COMMUNITY

## 2025-08-12 RX ORDER — MIDAZOLAM HYDROCHLORIDE 1 MG/ML
INJECTION, SOLUTION INTRAMUSCULAR; INTRAVENOUS PRN
Status: COMPLETED | OUTPATIENT
Start: 2025-08-12 | End: 2025-08-12

## 2025-08-12 RX ORDER — CEPHALEXIN 750 MG/1
CAPSULE ORAL
COMMUNITY

## 2025-08-12 RX ORDER — DOXYCYCLINE 100 MG/1
100 CAPSULE ORAL 2 TIMES DAILY
COMMUNITY

## 2025-08-12 RX ORDER — BROMFENAC SODIUM 0.7 MG/ML
SOLUTION/ DROPS OPHTHALMIC
COMMUNITY

## 2025-08-12 RX ORDER — BUTALBITAL, ACETAMINOPHEN AND CAFFEINE 50; 325; 40 MG/1; MG/1; MG/1
TABLET ORAL
COMMUNITY

## 2025-08-12 RX ORDER — LIDOCAINE HYDROCHLORIDE AND EPINEPHRINE BITARTRATE 20; .01 MG/ML; MG/ML
INJECTION, SOLUTION SUBCUTANEOUS PRN
Status: COMPLETED | OUTPATIENT
Start: 2025-08-12 | End: 2025-08-12

## 2025-08-12 RX ORDER — DORZOLAMIDE HYDROCHLORIDE AND TIMOLOL MALEATE 20; 5 MG/ML; MG/ML
SOLUTION/ DROPS OPHTHALMIC
COMMUNITY

## 2025-08-12 RX ORDER — LANCETS
EACH MISCELLANEOUS
COMMUNITY
Start: 2025-07-29

## 2025-08-12 RX ORDER — ASPIRIN 81 MG/1
81 TABLET ORAL DAILY
COMMUNITY

## 2025-08-12 RX ORDER — OXYCODONE AND ACETAMINOPHEN 5; 325 MG/1; MG/1
TABLET ORAL
COMMUNITY
Start: 2025-08-07

## 2025-08-12 RX ORDER — ATORVASTATIN CALCIUM 40 MG/1
40 TABLET, FILM COATED ORAL NIGHTLY
COMMUNITY

## 2025-08-12 RX ORDER — METOPROLOL TARTRATE 25 MG/1
25 TABLET, FILM COATED ORAL 2 TIMES DAILY
COMMUNITY

## 2025-08-12 RX ORDER — DEXAMETHASONE SODIUM PHOSPHATE 4 MG/ML
INJECTION, SOLUTION INTRA-ARTICULAR; INTRALESIONAL; INTRAMUSCULAR; INTRAVENOUS; SOFT TISSUE
COMMUNITY
Start: 2024-10-21

## 2025-08-12 RX ADMIN — MIDAZOLAM 1 MG: 1 INJECTION INTRAMUSCULAR; INTRAVENOUS at 08:20

## 2025-08-12 RX ADMIN — FENTANYL CITRATE 50 MCG: 50 INJECTION, SOLUTION INTRAMUSCULAR; INTRAVENOUS at 08:20

## 2025-08-12 RX ADMIN — MIDAZOLAM 1 MG: 1 INJECTION INTRAMUSCULAR; INTRAVENOUS at 08:11

## 2025-08-12 RX ADMIN — LIDOCAINE HYDROCHLORIDE AND EPINEPHRINE 10 ML: 20; 10 INJECTION, SOLUTION INFILTRATION; PERINEURAL at 08:22

## 2025-08-12 RX ADMIN — FENTANYL CITRATE 50 MCG: 50 INJECTION, SOLUTION INTRAMUSCULAR; INTRAVENOUS at 08:11

## 2025-08-12 RX ADMIN — LIDOCAINE HYDROCHLORIDE AND EPINEPHRINE 10 ML: 20; 10 INJECTION, SOLUTION INFILTRATION; PERINEURAL at 08:29

## 2025-08-12 ASSESSMENT — PAIN SCALES - GENERAL
PAINLEVEL_OUTOF10: 0

## (undated) DEVICE — 3M™ STERI-DRAPE™ INSTRUMENT POUCH 1018: Brand: STERI-DRAPE™

## (undated) DEVICE — SOLUTION IRRIGATION BAL SALT SOLUTION 500 ML BTL 6/CA BSS +

## (undated) DEVICE — PAD,EYE,1-5/8X2 5/8,STERILE,LF,1/PK: Brand: MEDLINE

## (undated) DEVICE — SURGICAL PROCEDURE PACK EYE CDS

## (undated) DEVICE — DISPOSABLE BIPOLAR CODE, 12' (3.66 M): Brand: CONMED

## (undated) DEVICE — 25 GA ILLUMINATED FLEXIBLE CURVED LASER PROBE: Brand: ALCON, ENGAUGE

## (undated) DEVICE — Device

## (undated) DEVICE — GARMENT,MEDLINE,DVT,INT,CALF,MED, GEN2: Brand: MEDLINE

## (undated) DEVICE — CONSTELLATION VISION SYSTEM 7500 CPM ULTRAVIT PROBE STRAIGHT ENDOILLUMINATOR 25+ TOTALPLUS VITRECTOMY PAK EDGEPLUS BLADE VALVED ENTRY SYSTEM: Brand: CONSTELLATION, ULTRAVIT, 25+, TOTALPLUS, EDGEPLUS

## (undated) DEVICE — SYRINGE MED 50ML LUERLOCK TIP

## (undated) DEVICE — SUTURE PERMAHAND SZ 3-0 L18IN NONABSORBABLE BLK SILK BRAID A184H

## (undated) DEVICE — SUTURE PLN GUT SZ 6-0 L18IN ABSRB YELLOWISH TAN L6.5MM 1735G

## (undated) DEVICE — Device: Brand: MILLEX-OR

## (undated) DEVICE — SYRINGE MED 5ML STD CLR PLAS LUERLOCK TIP N CTRL DISP

## (undated) DEVICE — SUTURE ETHLN SZ 5-0 L18IN NONABSORBABLE BLK L8MM RD-1 CNTR 749G

## (undated) DEVICE — MICROSURGICAL INSTRUMENT 25GA SOFT TIP CANNULA, DSP, 0.8MM: Brand: ALCON

## (undated) DEVICE — COVER,MAYO STAND,STERILE: Brand: MEDLINE

## (undated) DEVICE — 3M™ TEGADERM™ TRANSPARENT FILM DRESSING FRAME STYLE, 1626W, 4 IN X 4-3/4 IN (10 CM X 12 CM), 50/CT 4CT/CASE: Brand: 3M™ TEGADERM™

## (undated) DEVICE — DIATHERMY PROBE DSP, 25G: Brand: ALCON GRIESHABER

## (undated) DEVICE — PREP-RESISTANT MARKER W/ RULER: Brand: MEDLINE INDUSTRIES, INC.

## (undated) DEVICE — NEEDLE,30GX0.5"REG,BEVEL: Brand: MEDLINE